# Patient Record
Sex: MALE | Race: WHITE | URBAN - METROPOLITAN AREA
[De-identification: names, ages, dates, MRNs, and addresses within clinical notes are randomized per-mention and may not be internally consistent; named-entity substitution may affect disease eponyms.]

---

## 2017-10-08 ENCOUNTER — INPATIENT (INPATIENT)
Facility: HOSPITAL | Age: 62
LOS: 2 days | Discharge: HOME | End: 2017-10-11
Attending: INTERNAL MEDICINE | Admitting: INTERNAL MEDICINE
Payer: MEDICARE

## 2017-10-08 VITALS — HEIGHT: 72 IN | WEIGHT: 289.91 LBS

## 2017-10-08 DIAGNOSIS — Z96.60 PRESENCE OF UNSPECIFIED ORTHOPEDIC JOINT IMPLANT: Chronic | ICD-10-CM

## 2017-10-08 DIAGNOSIS — E87.1 HYPO-OSMOLALITY AND HYPONATREMIA: ICD-10-CM

## 2017-10-08 DIAGNOSIS — Z98.89 OTHER SPECIFIED POSTPROCEDURAL STATES: Chronic | ICD-10-CM

## 2017-10-08 DIAGNOSIS — Z93.1 GASTROSTOMY STATUS: Chronic | ICD-10-CM

## 2017-10-08 DIAGNOSIS — A41.9 SEPSIS, UNSPECIFIED ORGANISM: ICD-10-CM

## 2017-10-08 DIAGNOSIS — N20.0 CALCULUS OF KIDNEY: ICD-10-CM

## 2017-10-08 DIAGNOSIS — Z90.49 ACQUIRED ABSENCE OF OTHER SPECIFIED PARTS OF DIGESTIVE TRACT: Chronic | ICD-10-CM

## 2017-10-08 DIAGNOSIS — R79.1 ABNORMAL COAGULATION PROFILE: ICD-10-CM

## 2017-10-08 DIAGNOSIS — E87.6 HYPOKALEMIA: ICD-10-CM

## 2017-10-08 DIAGNOSIS — Z98.84 BARIATRIC SURGERY STATUS: Chronic | ICD-10-CM

## 2017-10-08 DIAGNOSIS — Z93.0 TRACHEOSTOMY STATUS: Chronic | ICD-10-CM

## 2017-10-08 DIAGNOSIS — N17.9 ACUTE KIDNEY FAILURE, UNSPECIFIED: ICD-10-CM

## 2017-10-08 LAB
ALBUMIN SERPL ELPH-MCNC: 3.3 G/DL — SIGNIFICANT CHANGE UP (ref 3.3–5)
ALP SERPL-CCNC: 99 U/L — SIGNIFICANT CHANGE UP (ref 40–120)
ALT FLD-CCNC: 19 U/L — SIGNIFICANT CHANGE UP (ref 12–78)
ANION GAP SERPL CALC-SCNC: 12 MMOL/L — SIGNIFICANT CHANGE UP (ref 5–17)
APPEARANCE UR: (no result)
APTT BLD: 35.1 SEC — SIGNIFICANT CHANGE UP (ref 27.5–37.4)
AST SERPL-CCNC: 21 U/L — SIGNIFICANT CHANGE UP (ref 15–37)
BACTERIA # UR AUTO: (no result)
BASOPHILS # BLD AUTO: 0.1 K/UL — SIGNIFICANT CHANGE UP (ref 0–0.2)
BILIRUB SERPL-MCNC: 1.4 MG/DL — HIGH (ref 0.2–1.2)
BILIRUB UR-MCNC: NEGATIVE — SIGNIFICANT CHANGE UP
BUN SERPL-MCNC: 13 MG/DL — SIGNIFICANT CHANGE UP (ref 7–23)
CALCIUM SERPL-MCNC: 8.3 MG/DL — LOW (ref 8.5–10.1)
CHLORIDE SERPL-SCNC: 99 MMOL/L — SIGNIFICANT CHANGE UP (ref 96–108)
CO2 SERPL-SCNC: 20 MMOL/L — LOW (ref 22–31)
COLOR SPEC: YELLOW — SIGNIFICANT CHANGE UP
CREAT SERPL-MCNC: 1.38 MG/DL — HIGH (ref 0.5–1.3)
DIFF PNL FLD: (no result)
EOSINOPHIL # BLD AUTO: 0 K/UL — SIGNIFICANT CHANGE UP (ref 0–0.5)
EPI CELLS # UR: SIGNIFICANT CHANGE UP
GLUCOSE SERPL-MCNC: 153 MG/DL — HIGH (ref 70–99)
GLUCOSE UR QL: NEGATIVE MG/DL — SIGNIFICANT CHANGE UP
HCT VFR BLD CALC: 36.7 % — LOW (ref 39–50)
HGB BLD-MCNC: 13 G/DL — SIGNIFICANT CHANGE UP (ref 13–17)
INR BLD: 2.07 RATIO — HIGH (ref 0.88–1.16)
KETONES UR-MCNC: NEGATIVE — SIGNIFICANT CHANGE UP
LACTATE SERPL-SCNC: 1.1 MMOL/L — SIGNIFICANT CHANGE UP (ref 0.7–2)
LACTATE SERPL-SCNC: 2.3 MMOL/L — HIGH (ref 0.7–2)
LEUKOCYTE ESTERASE UR-ACNC: (no result)
LYMPHOCYTES # BLD AUTO: 0.5 K/UL — LOW (ref 1–3.3)
LYMPHOCYTES # BLD AUTO: 3 % — LOW (ref 13–44)
MANUAL DIF COMMENT BLD-IMP: SIGNIFICANT CHANGE UP
MCHC RBC-ENTMCNC: 30.6 PG — SIGNIFICANT CHANGE UP (ref 27–34)
MCHC RBC-ENTMCNC: 35.6 GM/DL — SIGNIFICANT CHANGE UP (ref 32–36)
MCV RBC AUTO: 86 FL — SIGNIFICANT CHANGE UP (ref 80–100)
MONOCYTES # BLD AUTO: 1.7 K/UL — HIGH (ref 0–0.9)
MONOCYTES NFR BLD AUTO: 14 % — SIGNIFICANT CHANGE UP (ref 2–14)
NEUTROPHILS # BLD AUTO: 14.6 K/UL — HIGH (ref 1.8–7.4)
NEUTROPHILS NFR BLD AUTO: 80 % — HIGH (ref 43–77)
NEUTS BAND # BLD: 3 % — SIGNIFICANT CHANGE UP (ref 0–8)
NITRITE UR-MCNC: NEGATIVE — SIGNIFICANT CHANGE UP
PH UR: 6 — SIGNIFICANT CHANGE UP (ref 5–8)
PLAT MORPH BLD: NORMAL — SIGNIFICANT CHANGE UP
PLATELET # BLD AUTO: 140 K/UL — LOW (ref 150–400)
POIKILOCYTOSIS BLD QL AUTO: SLIGHT — SIGNIFICANT CHANGE UP
POLYCHROMASIA BLD QL SMEAR: SLIGHT — SIGNIFICANT CHANGE UP
POTASSIUM SERPL-MCNC: 3.3 MMOL/L — LOW (ref 3.5–5.3)
POTASSIUM SERPL-SCNC: 3.3 MMOL/L — LOW (ref 3.5–5.3)
PROT SERPL-MCNC: 6.9 GM/DL — SIGNIFICANT CHANGE UP (ref 6–8.3)
PROT UR-MCNC: 15 MG/DL
PROTHROM AB SERPL-ACNC: 22.7 SEC — HIGH (ref 9.8–12.7)
RBC # BLD: 4.26 M/UL — SIGNIFICANT CHANGE UP (ref 4.2–5.8)
RBC # FLD: 12.7 % — SIGNIFICANT CHANGE UP (ref 10.3–14.5)
RBC BLD AUTO: SIGNIFICANT CHANGE UP
RBC CASTS # UR COMP ASSIST: (no result) /HPF (ref 0–4)
SODIUM SERPL-SCNC: 131 MMOL/L — LOW (ref 135–145)
SP GR SPEC: 1.01 — SIGNIFICANT CHANGE UP (ref 1.01–1.02)
UROBILINOGEN FLD QL: NEGATIVE MG/DL — SIGNIFICANT CHANGE UP
WBC # BLD: 16.8 K/UL — HIGH (ref 3.8–10.5)
WBC # FLD AUTO: 16.8 K/UL — HIGH (ref 3.8–10.5)
WBC UR QL: (no result)

## 2017-10-08 PROCEDURE — 71010: CPT | Mod: 26

## 2017-10-08 PROCEDURE — 93010 ELECTROCARDIOGRAM REPORT: CPT

## 2017-10-08 PROCEDURE — 99285 EMERGENCY DEPT VISIT HI MDM: CPT

## 2017-10-08 RX ORDER — SODIUM CHLORIDE 9 MG/ML
1000 INJECTION INTRAMUSCULAR; INTRAVENOUS; SUBCUTANEOUS ONCE
Qty: 0 | Refills: 0 | Status: COMPLETED | OUTPATIENT
Start: 2017-10-08 | End: 2017-10-08

## 2017-10-08 RX ORDER — SODIUM CHLORIDE 9 MG/ML
2000 INJECTION INTRAMUSCULAR; INTRAVENOUS; SUBCUTANEOUS ONCE
Qty: 0 | Refills: 0 | Status: COMPLETED | OUTPATIENT
Start: 2017-10-08 | End: 2017-10-09

## 2017-10-08 RX ORDER — ACETAMINOPHEN 500 MG
1000 TABLET ORAL ONCE
Qty: 0 | Refills: 0 | Status: COMPLETED | OUTPATIENT
Start: 2017-10-08 | End: 2017-10-08

## 2017-10-08 RX ORDER — PANTOPRAZOLE SODIUM 20 MG/1
40 TABLET, DELAYED RELEASE ORAL
Qty: 0 | Refills: 0 | Status: DISCONTINUED | OUTPATIENT
Start: 2017-10-08 | End: 2017-10-11

## 2017-10-08 RX ORDER — VANCOMYCIN HCL 1 G
1000 VIAL (EA) INTRAVENOUS ONCE
Qty: 0 | Refills: 0 | Status: COMPLETED | OUTPATIENT
Start: 2017-10-08 | End: 2017-10-08

## 2017-10-08 RX ORDER — LEVOTHYROXINE SODIUM 125 MCG
75 TABLET ORAL DAILY
Qty: 0 | Refills: 0 | Status: DISCONTINUED | OUTPATIENT
Start: 2017-10-08 | End: 2017-10-11

## 2017-10-08 RX ORDER — CEFEPIME 1 G/1
2000 INJECTION, POWDER, FOR SOLUTION INTRAMUSCULAR; INTRAVENOUS ONCE
Qty: 0 | Refills: 0 | Status: COMPLETED | OUTPATIENT
Start: 2017-10-08 | End: 2017-10-08

## 2017-10-08 RX ORDER — RIVAROXABAN 15 MG-20MG
20 KIT ORAL EVERY 24 HOURS
Qty: 0 | Refills: 0 | Status: DISCONTINUED | OUTPATIENT
Start: 2017-10-08 | End: 2017-10-09

## 2017-10-08 RX ORDER — AMLODIPINE BESYLATE 2.5 MG/1
10 TABLET ORAL DAILY
Qty: 0 | Refills: 0 | Status: DISCONTINUED | OUTPATIENT
Start: 2017-10-08 | End: 2017-10-10

## 2017-10-08 RX ORDER — ONDANSETRON 8 MG/1
4 TABLET, FILM COATED ORAL ONCE
Qty: 0 | Refills: 0 | Status: COMPLETED | OUTPATIENT
Start: 2017-10-08 | End: 2017-10-08

## 2017-10-08 RX ORDER — MORPHINE SULFATE 50 MG/1
4 CAPSULE, EXTENDED RELEASE ORAL ONCE
Qty: 0 | Refills: 0 | Status: DISCONTINUED | OUTPATIENT
Start: 2017-10-08 | End: 2017-10-08

## 2017-10-08 RX ADMIN — ONDANSETRON 4 MILLIGRAM(S): 8 TABLET, FILM COATED ORAL at 20:40

## 2017-10-08 RX ADMIN — SODIUM CHLORIDE 1000 MILLILITER(S): 9 INJECTION INTRAMUSCULAR; INTRAVENOUS; SUBCUTANEOUS at 19:45

## 2017-10-08 RX ADMIN — Medication 1000 MILLIGRAM(S): at 19:40

## 2017-10-08 RX ADMIN — MORPHINE SULFATE 4 MILLIGRAM(S): 50 CAPSULE, EXTENDED RELEASE ORAL at 20:40

## 2017-10-08 RX ADMIN — CEFEPIME 100 MILLIGRAM(S): 1 INJECTION, POWDER, FOR SOLUTION INTRAMUSCULAR; INTRAVENOUS at 20:01

## 2017-10-08 RX ADMIN — Medication 250 MILLIGRAM(S): at 20:26

## 2017-10-08 NOTE — H&P ADULT - NSHPSOCIALHISTORY_GEN_ALL_CORE
History of ETOH with withdraw seizure 2015, quit drinking 2015  Never smoker  Medical Marijuana for neurology pain  Live with son     Full Code

## 2017-10-08 NOTE — H&P ADULT - NSHPPHYSICALEXAM_GEN_ALL_CORE
PHYSICAL EXAM:    GENERAL: NAD, well-groomed, well-developed  HEAD:  NC/AT  EYES: EOMI, PERRLA, no scleral icterus  HEENT: Moist mucous membranes  NECK: Supple, No JVD  CNS:  Alert & Oriented X3  LUNG: Clear to auscultation bilaterally; No rales, rhonchi, wheezing, or rubs  HEART: RRR; No murmurs, rubs, or gallops  ABDOMEN: +BS, multiple surgical scars, +CVA tenderness left Vital Signs Last 24 Hrs  T(C): 37 (09 Oct 2017 01:30), Max: 40.4 (08 Oct 2017 19:29)  T(F): 98.6 (09 Oct 2017 01:30), Max: 104.7 (08 Oct 2017 19:29)  HR: 84 (09 Oct 2017 01:30) (84 - 120)  BP: 115/56 (09 Oct 2017 01:30) (108/52 - 135/63)  BP(mean): --  RR: 18 (09 Oct 2017 01:30) (18 - 22)  SpO2: 98% (09 Oct 2017 01:30) (98% - 99%)    PHYSICAL EXAM:    GENERAL: NAD, well-groomed, well-developed  HEAD:  NC/AT  EYES: EOMI, PERRLA, no scleral icterus  HEENT: Moist mucous membranes  NECK: Supple, No JVD  CNS:  Alert & Oriented X3  LUNG: Clear to auscultation bilaterally; No rales, rhonchi, wheezing, or rubs  HEART: RRR; No murmurs, rubs, or gallops  ABDOMEN: +BS, multiple surgical scars, +CVA tenderness left

## 2017-10-08 NOTE — H&P ADULT - PSH
Feeding by G-tube  placed and reversed summer 2015  H/O gastric bypass  1990s and 2011  History of gastric surgery  gastric sleeve 1990s  History of tracheostomy  placed and reversed summer 2015  S/P cholecystectomy    S/P hip replacement  b/l

## 2017-10-08 NOTE — H&P ADULT - PMH
Acquired hypothyroidism    Alcohol abuse  sober since 4/13/2015  DVT (deep venous thrombosis), right  ankle  Hypertension    Other hyperlipidemia    Pulmonary emboli

## 2017-10-08 NOTE — H&P ADULT - PROBLEM SELECTOR PLAN 1
Likely secondary to urosepsis  Admit to med-surg   F/u UA, blood culture  Lactate 2.6-->1   s/p 1L IVF at ED  Cont IVF at 150cc/hr  Cont Vanco and cefepime   Urology consult   ID consult Likely secondary to urosepsis  Admit to med-surg   F/u UA, blood culture  Lactate 2.6-->1   s/p 1L IVF at ED  2L Stat NS  Cont IVF at 150cc/hr  Cont Vanco and cefepime   Urology consult   ID consult Likely secondary to urosepsis  Admit to med-surg   F/u UA, blood culture  Lactate 2.6-->1   s/p 1L IVF at ED  2L Stat NS  Cont IVF at 150cc/hr  Cont cefepime   Urology consult   ID consult for continue of vanco

## 2017-10-08 NOTE — ED PROVIDER NOTE - OBJECTIVE STATEMENT
61 y/o M with Hx of HTN, HLD, newly diagnosed kidney stone presents to ED for evaluation of worsening left flank pain, fever, and n/v. Pt states he [facility]. The stone size was 1.2cm and he was encouraged to f/u with urology this week. Pt today developed worsening n/v with a fever (104.7F). Pt denies HA, SOB, CP.

## 2017-10-08 NOTE — H&P ADULT - NSHPLABSRESULTS_GEN_ALL_CORE
Lab Results:                                            13.0                  Neurophils% (auto):   80.0   (10-08 @ 20:00):    16.8 )-----------(140          Lymphocytes% (auto):  3.0                                           36.7                   Eosinphils% (auto):   x        Manual%: Neutrophils x    ; Lymphocytes x    ; Eosinophils x    ; Bands%: 3.0  ; Blasts x         Differential:	[] Automated		[] Manual    10-08    131<L>  |  99  |  13  ----------------------------<  153<H>  3.3<L>   |  20<L>  |  1.38<H>    Ca    8.3<L>      08 Oct 2017 20:00    TPro  6.9  /  Alb  3.3  /  TBili  1.4<H>  /  DBili  x   /  AST  21  /  ALT  19  /  AlkPhos  99  10-08    PT/INR - ( 08 Oct 2017 20:00 )   PT: 22.7 sec;   INR: 2.07 ratio         PTT - ( 08 Oct 2017 20:00 )  PTT:35.1 sec  Urinalysis Basic - ( 08 Oct 2017 21:38 )    Color: Yellow / Appearance: Slightly Turbid / S.015 / pH: x  Gluc: x / Ketone: Negative  / Bili: Negative / Urobili: Negative mg/dL   Blood: x / Protein: 15 mg/dL / Nitrite: Negative   Leuk Esterase: Moderate / RBC: 6-10 /HPF / WBC 26-50   Sq Epi: x / Non Sq Epi: Occasional / Bacteria: Few

## 2017-10-08 NOTE — ED ADULT TRIAGE NOTE - CHIEF COMPLAINT QUOTE
kidney stone with fever at home. Pt was dx with kidney stone on the 7th/ Pt was too weak to get out of bed today.

## 2017-10-08 NOTE — ED STATDOCS - PSH
Feeding by G-tube  placed and reversed summer 2015  H/O gastric bypass  1990s and 2011  History of gastric surgery  gastric sleeve 1990s  History of tracheostomy  placed and reversed summer 2015  S/P hip replacement  b/l

## 2017-10-08 NOTE — H&P ADULT - HISTORY OF PRESENT ILLNESS
Pt is a 61yo M w PMH of HTN, HLD, hypothyrodism, multiple DVT, PE, gastric sleeve surgery present to ER with c/o fever, chills and left flank pain. Pt states he started developed sharp left flank pain 3 days ago and [facility]. Pt was diagnosed with multiple kidney stones with largest 1.2cm, pt was given Cipro and naprosyn and was DCed home with follow up appointment with urology on Tue. Pt was instructed to go to ER if developed fever. Pt states today pt have fever and chills. Tmax was 104F. Pt has 8/10 sharp non radiating left flank pain, better with morphine. It is associated with nausea and dry heaving. Pt has small amount of urination today, no blood. Pt states he also has questionable enlarged prostate. Pt denies of any CP, SOB, swelling of legs, problem with bowel.     At ER. WBC Pt is a 61yo M w PMH of HTN, HLD, hypothyrodism, multiple DVT, PE, gastric sleeve surgery present to ER with c/o fever, chills and left flank pain. Pt states he started developed sharp left flank pain 3 days ago and [facility]. Pt was diagnosed with multiple kidney stones with largest 1.2cm, pt was given Cipro and naprosyn and was DCed home with follow up appointment with urology on Tue. Pt was instructed to go to ER if developed fever. Pt states today pt have fever and chills. Tmax was 104F. Pt has 8/10 sharp non radiating left flank pain, better with morphine. It is associated with nausea and dry heaving. Pt has small amount of urination today, no blood. Pt states he also has questionable enlarged prostate. Pt denies of any CP, SOB, swelling of legs, problem with bowel.     At ER. WBC 16.8, Lactate 2.3, INR 2.07, PT 22.7. Pt was given 1 L NS bolus, Vanco and Cefipime. Pending blood and urine culture. Pt is a 61yo M w PMH of HTN, HLD, hypothyrodism, multiple DVT, PE, gastric sleeve surgery present to ER with c/o fever, chills and left flank pain. Pt states he started developed sharp left flank pain 3 days ago and [facility]. Pt was diagnosed with multiple kidney stones with largest 1.2cm, pt was given Cipro and naprosyn and was DCed home with follow up appointment with urology on Tue. Pt was instructed to go to ER if developed fever. Pt states today pt have fever and chills. Tmax was 104F. Pt has 8/10 sharp non radiating left flank pain, better with morphine. It is associated with nausea and dry heaving. Pt has small amount of urination today, no blood. Pt states he also has questionable enlarged prostate. Pt denies of any CP, SOB, swelling of legs, problem with bowel.     At ER. WBC 16.8, Lactate 2.3, +UA, INR 2.07, PT 22.7. Pt was given 1 L NS bolus, Vanco and Cefipime. Pending blood and urine culture.

## 2017-10-08 NOTE — H&P ADULT - ATTENDING COMMENTS
Pt was seen and examined after initial eval by resident physician Kishore Guillory. Case discussed in detail. Agree with assessment and plan. This is a 62 y.o. male with PMH HTN, HLD, hypothyroidism, multiple DVT, PE, gastric sleeve surgery presents with fever, chills and left flank pain.    #severe sepsis (Tm 104.7, leukocytosis 16.8, tachycardia 120, lactate 2.3->1.1) due to UTI, possible infected kidney stone  -resolving  -admit to med surg  -f/u cultures  -CT abd w/o contrast  -iv hydration  -cont cefepime (Allergy to PCN, but pt tolerated cefepime in ED)  -pain control, antipyretics  -urology consult  -ID consult    #hypokalemia, hyponatremia  -replete and monitor  -iv fluids    #MESHA likely from kidney stones  -monitor renal function, iv fluids    #hx DVT/PE  #DVT ppx  -on xarelto    #will need med rec in AM  -pt goes to [facility]. States sees Dr Flores. [facility].

## 2017-10-09 LAB
ANION GAP SERPL CALC-SCNC: 10 MMOL/L — SIGNIFICANT CHANGE UP (ref 5–17)
ANION GAP SERPL CALC-SCNC: 9 MMOL/L — SIGNIFICANT CHANGE UP (ref 5–17)
APTT BLD: 32.1 SEC — SIGNIFICANT CHANGE UP (ref 27.5–37.4)
BASOPHILS # BLD AUTO: 0 K/UL — SIGNIFICANT CHANGE UP (ref 0–0.2)
BASOPHILS NFR BLD AUTO: 0.2 % — SIGNIFICANT CHANGE UP (ref 0–2)
BUN SERPL-MCNC: 12 MG/DL — SIGNIFICANT CHANGE UP (ref 7–23)
BUN SERPL-MCNC: 14 MG/DL — SIGNIFICANT CHANGE UP (ref 7–23)
CALCIUM SERPL-MCNC: 7.6 MG/DL — LOW (ref 8.5–10.1)
CALCIUM SERPL-MCNC: 7.7 MG/DL — LOW (ref 8.5–10.1)
CHLORIDE SERPL-SCNC: 104 MMOL/L — SIGNIFICANT CHANGE UP (ref 96–108)
CHLORIDE SERPL-SCNC: 105 MMOL/L — SIGNIFICANT CHANGE UP (ref 96–108)
CO2 SERPL-SCNC: 21 MMOL/L — LOW (ref 22–31)
CO2 SERPL-SCNC: 21 MMOL/L — LOW (ref 22–31)
CREAT SERPL-MCNC: 1.04 MG/DL — SIGNIFICANT CHANGE UP (ref 0.5–1.3)
CREAT SERPL-MCNC: 1.16 MG/DL — SIGNIFICANT CHANGE UP (ref 0.5–1.3)
CULTURE RESULTS: SIGNIFICANT CHANGE UP
EOSINOPHIL # BLD AUTO: 0 K/UL — SIGNIFICANT CHANGE UP (ref 0–0.5)
EOSINOPHIL NFR BLD AUTO: 0 % — SIGNIFICANT CHANGE UP (ref 0–6)
GLUCOSE SERPL-MCNC: 104 MG/DL — HIGH (ref 70–99)
GLUCOSE SERPL-MCNC: 92 MG/DL — SIGNIFICANT CHANGE UP (ref 70–99)
HCT VFR BLD CALC: 31.4 % — LOW (ref 39–50)
HCT VFR BLD CALC: 33.2 % — LOW (ref 39–50)
HGB BLD-MCNC: 11 G/DL — LOW (ref 13–17)
HGB BLD-MCNC: 11.6 G/DL — LOW (ref 13–17)
INR BLD: 1.77 RATIO — HIGH (ref 0.88–1.16)
LACTATE SERPL-SCNC: 1.3 MMOL/L — SIGNIFICANT CHANGE UP (ref 0.7–2)
LACTATE SERPL-SCNC: 1.7 MMOL/L — SIGNIFICANT CHANGE UP (ref 0.7–2)
LACTATE SERPL-SCNC: 2.7 MMOL/L — HIGH (ref 0.7–2)
LDH SERPL L TO P-CCNC: 202 U/L — SIGNIFICANT CHANGE UP (ref 50–242)
LYMPHOCYTES # BLD AUTO: 0.7 K/UL — LOW (ref 1–3.3)
LYMPHOCYTES # BLD AUTO: 7 % — LOW (ref 13–44)
MCHC RBC-ENTMCNC: 30.1 PG — SIGNIFICANT CHANGE UP (ref 27–34)
MCHC RBC-ENTMCNC: 30.4 PG — SIGNIFICANT CHANGE UP (ref 27–34)
MCHC RBC-ENTMCNC: 34.8 GM/DL — SIGNIFICANT CHANGE UP (ref 32–36)
MCHC RBC-ENTMCNC: 35.1 GM/DL — SIGNIFICANT CHANGE UP (ref 32–36)
MCV RBC AUTO: 86.4 FL — SIGNIFICANT CHANGE UP (ref 80–100)
MCV RBC AUTO: 86.5 FL — SIGNIFICANT CHANGE UP (ref 80–100)
MONOCYTES # BLD AUTO: 0.9 K/UL — SIGNIFICANT CHANGE UP (ref 0–0.9)
MONOCYTES NFR BLD AUTO: 9 % — SIGNIFICANT CHANGE UP (ref 2–14)
NEUTROPHILS # BLD AUTO: 8.1 K/UL — HIGH (ref 1.8–7.4)
NEUTROPHILS NFR BLD AUTO: 83.8 % — HIGH (ref 43–77)
PLATELET # BLD AUTO: 104 K/UL — LOW (ref 150–400)
PLATELET # BLD AUTO: 106 K/UL — LOW (ref 150–400)
POTASSIUM SERPL-MCNC: 3.6 MMOL/L — SIGNIFICANT CHANGE UP (ref 3.5–5.3)
POTASSIUM SERPL-MCNC: 3.7 MMOL/L — SIGNIFICANT CHANGE UP (ref 3.5–5.3)
POTASSIUM SERPL-SCNC: 3.6 MMOL/L — SIGNIFICANT CHANGE UP (ref 3.5–5.3)
POTASSIUM SERPL-SCNC: 3.7 MMOL/L — SIGNIFICANT CHANGE UP (ref 3.5–5.3)
PROTHROM AB SERPL-ACNC: 19.3 SEC — HIGH (ref 9.8–12.7)
RBC # BLD: 3.63 M/UL — LOW (ref 4.2–5.8)
RBC # BLD: 3.84 M/UL — LOW (ref 4.2–5.8)
RBC # FLD: 12.9 % — SIGNIFICANT CHANGE UP (ref 10.3–14.5)
RBC # FLD: 13 % — SIGNIFICANT CHANGE UP (ref 10.3–14.5)
SODIUM SERPL-SCNC: 135 MMOL/L — SIGNIFICANT CHANGE UP (ref 135–145)
SODIUM SERPL-SCNC: 135 MMOL/L — SIGNIFICANT CHANGE UP (ref 135–145)
SPECIMEN SOURCE: SIGNIFICANT CHANGE UP
TSH SERPL-MCNC: 0.45 UIU/ML — SIGNIFICANT CHANGE UP (ref 0.36–3.74)
WBC # BLD: 8 K/UL — SIGNIFICANT CHANGE UP (ref 3.8–10.5)
WBC # BLD: 9.6 K/UL — SIGNIFICANT CHANGE UP (ref 3.8–10.5)
WBC # FLD AUTO: 8 K/UL — SIGNIFICANT CHANGE UP (ref 3.8–10.5)
WBC # FLD AUTO: 9.6 K/UL — SIGNIFICANT CHANGE UP (ref 3.8–10.5)

## 2017-10-09 PROCEDURE — 71010: CPT | Mod: 26

## 2017-10-09 PROCEDURE — 74176 CT ABD & PELVIS W/O CONTRAST: CPT | Mod: 26

## 2017-10-09 RX ORDER — ACETAMINOPHEN 500 MG
650 TABLET ORAL EVERY 6 HOURS
Qty: 0 | Refills: 0 | Status: DISCONTINUED | OUTPATIENT
Start: 2017-10-09 | End: 2017-10-11

## 2017-10-09 RX ORDER — SODIUM CHLORIDE 9 MG/ML
1000 INJECTION INTRAMUSCULAR; INTRAVENOUS; SUBCUTANEOUS
Qty: 0 | Refills: 0 | Status: DISCONTINUED | OUTPATIENT
Start: 2017-10-09 | End: 2017-10-11

## 2017-10-09 RX ORDER — HEPARIN SODIUM 5000 [USP'U]/ML
INJECTION INTRAVENOUS; SUBCUTANEOUS
Qty: 25000 | Refills: 0 | Status: DISCONTINUED | OUTPATIENT
Start: 2017-10-09 | End: 2017-10-09

## 2017-10-09 RX ORDER — SODIUM CHLORIDE 9 MG/ML
1000 INJECTION INTRAMUSCULAR; INTRAVENOUS; SUBCUTANEOUS
Qty: 0 | Refills: 0 | Status: DISCONTINUED | OUTPATIENT
Start: 2017-10-09 | End: 2017-10-09

## 2017-10-09 RX ORDER — ACETAMINOPHEN 500 MG
650 TABLET ORAL ONCE
Qty: 0 | Refills: 0 | Status: COMPLETED | OUTPATIENT
Start: 2017-10-09 | End: 2017-10-09

## 2017-10-09 RX ORDER — MORPHINE SULFATE 50 MG/1
2 CAPSULE, EXTENDED RELEASE ORAL EVERY 6 HOURS
Qty: 0 | Refills: 0 | Status: COMPLETED | OUTPATIENT
Start: 2017-10-09 | End: 2017-10-10

## 2017-10-09 RX ORDER — CEFTRIAXONE 500 MG/1
1 INJECTION, POWDER, FOR SOLUTION INTRAMUSCULAR; INTRAVENOUS EVERY 24 HOURS
Qty: 0 | Refills: 0 | Status: DISCONTINUED | OUTPATIENT
Start: 2017-10-09 | End: 2017-10-09

## 2017-10-09 RX ORDER — POTASSIUM CHLORIDE 20 MEQ
20 PACKET (EA) ORAL ONCE
Qty: 0 | Refills: 0 | Status: COMPLETED | OUTPATIENT
Start: 2017-10-09 | End: 2017-10-09

## 2017-10-09 RX ORDER — ONDANSETRON 8 MG/1
4 TABLET, FILM COATED ORAL EVERY 6 HOURS
Qty: 0 | Refills: 0 | Status: DISCONTINUED | OUTPATIENT
Start: 2017-10-09 | End: 2017-10-11

## 2017-10-09 RX ORDER — CEFEPIME 1 G/1
2000 INJECTION, POWDER, FOR SOLUTION INTRAMUSCULAR; INTRAVENOUS EVERY 12 HOURS
Qty: 0 | Refills: 0 | Status: DISCONTINUED | OUTPATIENT
Start: 2017-10-09 | End: 2017-10-09

## 2017-10-09 RX ORDER — CEFTRIAXONE 500 MG/1
1 INJECTION, POWDER, FOR SOLUTION INTRAMUSCULAR; INTRAVENOUS EVERY 24 HOURS
Qty: 0 | Refills: 0 | Status: DISCONTINUED | OUTPATIENT
Start: 2017-10-09 | End: 2017-10-11

## 2017-10-09 RX ORDER — HEPARIN SODIUM 5000 [USP'U]/ML
10000 INJECTION INTRAVENOUS; SUBCUTANEOUS EVERY 6 HOURS
Qty: 0 | Refills: 0 | Status: DISCONTINUED | OUTPATIENT
Start: 2017-10-10 | End: 2017-10-11

## 2017-10-09 RX ORDER — HEPARIN SODIUM 5000 [USP'U]/ML
10000 INJECTION INTRAVENOUS; SUBCUTANEOUS ONCE
Qty: 0 | Refills: 0 | Status: DISCONTINUED | OUTPATIENT
Start: 2017-10-09 | End: 2017-10-09

## 2017-10-09 RX ORDER — HEPARIN SODIUM 5000 [USP'U]/ML
10000 INJECTION INTRAVENOUS; SUBCUTANEOUS EVERY 6 HOURS
Qty: 0 | Refills: 0 | Status: DISCONTINUED | OUTPATIENT
Start: 2017-10-09 | End: 2017-10-09

## 2017-10-09 RX ORDER — INFLUENZA VIRUS VACCINE 15; 15; 15; 15 UG/.5ML; UG/.5ML; UG/.5ML; UG/.5ML
0.5 SUSPENSION INTRAMUSCULAR ONCE
Qty: 0 | Refills: 0 | Status: COMPLETED | OUTPATIENT
Start: 2017-10-09 | End: 2017-10-10

## 2017-10-09 RX ORDER — HEPARIN SODIUM 5000 [USP'U]/ML
5000 INJECTION INTRAVENOUS; SUBCUTANEOUS EVERY 6 HOURS
Qty: 0 | Refills: 0 | Status: DISCONTINUED | OUTPATIENT
Start: 2017-10-09 | End: 2017-10-09

## 2017-10-09 RX ORDER — HEPARIN SODIUM 5000 [USP'U]/ML
5000 INJECTION INTRAVENOUS; SUBCUTANEOUS EVERY 6 HOURS
Qty: 0 | Refills: 0 | Status: DISCONTINUED | OUTPATIENT
Start: 2017-10-10 | End: 2017-10-11

## 2017-10-09 RX ORDER — IBUPROFEN 200 MG
400 TABLET ORAL ONCE
Qty: 0 | Refills: 0 | Status: COMPLETED | OUTPATIENT
Start: 2017-10-09 | End: 2017-10-09

## 2017-10-09 RX ORDER — HEPARIN SODIUM 5000 [USP'U]/ML
INJECTION INTRAVENOUS; SUBCUTANEOUS
Qty: 25000 | Refills: 0 | Status: DISCONTINUED | OUTPATIENT
Start: 2017-10-10 | End: 2017-10-11

## 2017-10-09 RX ADMIN — Medication 650 MILLIGRAM(S): at 21:32

## 2017-10-09 RX ADMIN — SODIUM CHLORIDE 150 MILLILITER(S): 9 INJECTION INTRAMUSCULAR; INTRAVENOUS; SUBCUTANEOUS at 11:15

## 2017-10-09 RX ADMIN — MORPHINE SULFATE 2 MILLIGRAM(S): 50 CAPSULE, EXTENDED RELEASE ORAL at 22:28

## 2017-10-09 RX ADMIN — AMLODIPINE BESYLATE 10 MILLIGRAM(S): 2.5 TABLET ORAL at 06:26

## 2017-10-09 RX ADMIN — RIVAROXABAN 20 MILLIGRAM(S): KIT at 08:37

## 2017-10-09 RX ADMIN — SODIUM CHLORIDE 1000 MILLILITER(S): 9 INJECTION INTRAMUSCULAR; INTRAVENOUS; SUBCUTANEOUS at 00:30

## 2017-10-09 RX ADMIN — CEFTRIAXONE 100 GRAM(S): 500 INJECTION, POWDER, FOR SOLUTION INTRAMUSCULAR; INTRAVENOUS at 18:34

## 2017-10-09 RX ADMIN — Medication 20 MILLIEQUIVALENT(S): at 01:59

## 2017-10-09 RX ADMIN — Medication 75 MICROGRAM(S): at 06:25

## 2017-10-09 RX ADMIN — Medication 650 MILLIGRAM(S): at 06:26

## 2017-10-09 RX ADMIN — CEFEPIME 100 MILLIGRAM(S): 1 INJECTION, POWDER, FOR SOLUTION INTRAMUSCULAR; INTRAVENOUS at 06:25

## 2017-10-09 RX ADMIN — SODIUM CHLORIDE 100 MILLILITER(S): 9 INJECTION INTRAMUSCULAR; INTRAVENOUS; SUBCUTANEOUS at 18:35

## 2017-10-09 RX ADMIN — Medication 1000 MILLIGRAM(S): at 01:37

## 2017-10-09 RX ADMIN — PANTOPRAZOLE SODIUM 40 MILLIGRAM(S): 20 TABLET, DELAYED RELEASE ORAL at 06:25

## 2017-10-09 RX ADMIN — MORPHINE SULFATE 4 MILLIGRAM(S): 50 CAPSULE, EXTENDED RELEASE ORAL at 01:37

## 2017-10-09 RX ADMIN — SODIUM CHLORIDE 150 MILLILITER(S): 9 INJECTION INTRAMUSCULAR; INTRAVENOUS; SUBCUTANEOUS at 03:56

## 2017-10-09 NOTE — PROGRESS NOTE ADULT - SUBJECTIVE AND OBJECTIVE BOX
Pt was seen and examined at bedside because RN called to eval. for Temp: Pt was seen and examined at bedside because RN called to shannan. for Temp:  102.8 F. Pt is resting in bed. Pt reported that he is having flank pain since admission but did not receive pain meds this evening sopain is getting worse now requesting pain meds IV Morphine. Also c/o nausea. Denies gross hematuria, diarrhea or chills. Pt is admitted for sepsis 2/2 to Nephrolithiasis. Pt is on IV Ceftriaxone and IVF gentle hydration. ON exam: left flank tenderness +, no rebound tenderness. Stat CBC, BMP and lactate ordered.     Vitals: 102.8 F 126/54 mm hg, 107, 18,  100 % at RA    A/P:  # Nephrolithiasis  - Pt is on 5E  - Stat lactate: 1.3  - Leucocytosis improved as compare to admission  - VSS except Temp of 102.8 F  - c/w IV Abx  - IVF NS hydration  - Less likely septic now, as lactate is wnl, leucocytosis improved, No hypotension, no tachycardia  - Blood culture - no growth to date   - Pt is going for possible left percutaneous nephrostomy  - Tylenol prn/Cold compress  - Pain meds prn    Hospitalist to f/u in AM    Plan d/w Pt and Night RN

## 2017-10-09 NOTE — PROGRESS NOTE ADULT - PROBLEM SELECTOR PLAN 1
Pt with urinary infection, probably chronic and relates to possible left early staghorn stone as well as bph with incomplete emptying of nladder. Pt currently resting comfortably in chair. A repeat stat lactate level is pending. The xeralto is stopped and pt is on iv heparin. Possible left percutaneous nephrostomy.

## 2017-10-09 NOTE — PROGRESS NOTE ADULT - SUBJECTIVE AND OBJECTIVE BOX
Pt is a 63yo M w PMH of HTN, HLD, hypothyrodism, multiple DVT, PE, gastric sleeve surgery present to ER with c/o fever, chills and left flank pain. Pt states he started developed sharp left flank pain 3 days ago and [facility]. Pt was diagnosed with multiple kidney stones with largest 1.2cm, pt was given Cipro and naprosyn and was DCed home with follow up appointment with urology on Tue. Pt was instructed to go to ER if developed fever. Pt states today pt have fever and chills. Tmax was 104F. Pt has 8/10 sharp non radiating left flank pain, better with morphine. It is associated with nausea and dry heaving. Adm for furthe management    10/9: feels better, has an appetite this am; not in pain    Vital Signs Last 24 Hrs  T(C): 38.1 (09 Oct 2017 08:54), Max: 40.4 (08 Oct 2017 19:29)  T(F): 100.6 (09 Oct 2017 08:54), Max: 104.7 (08 Oct 2017 19:29)  HR: 84 (09 Oct 2017 06:50) (78 - 120)  BP: 139/50 (09 Oct 2017 06:50) (108/52 - 139/50)  BP(mean): --  RR: 18 (09 Oct 2017 06:50) (18 - 22)  SpO2: 98% (09 Oct 2017 06:50) (98% - 100%)    · CONSTITUTIONAL: Well appearing, well nourished, awake, alert, oriented to person, place, time/situation and in no apparent distress.  · ENMT: Airway patent, Nasal mucosa clear. Mouth with normal mucosa. Throat has no vesicles, no oropharyngeal exudates and uvula is midline.  · EYES: Clear bilaterally, pupils equal, round and reactive to light.  · CARDIAC: Normal rate, regular rhythm.  Heart sounds S1, S2.  No murmurs, rubs or gallops.  · RESPIRATORY: Breath sounds clear and equal bilaterally.  · GASTROINTESTINAL: Abdomen soft, non-tender, no guarding.  · MUSCULOSKELETAL: Spine appears normal, range of motion is not limited, no muscle or joint tenderness                            11.6   9.6   )-----------( 106      ( 09 Oct 2017 07:13 )             33.2   10-09    135  |  104  |  12  ----------------------------<  104<H>  3.6   |  21<L>  |  1.16    Ca    7.7<L>      09 Oct 2017 07:13    TPro  6.9  /  Alb  3.3  /  TBili  1.4<H>  /  DBili  x   /  AST  21  /  ALT  19  /  AlkPhos  99  10-08    lactate 2.7      A/P:    Sepsis  Nephrolithiasis  h/o VTE    Plan:  - ceftriaxone  - repeat lactate c/w ivf  - IR eval requested per Dr Rodrigues re: PCN  - h/o VTE: dc DOAC for now and use UFH drip for poss procedure    case d/w ; message left for IR Pt is a 61yo M w PMH of HTN, HLD, hypothyrodism, multiple DVT, PE, gastric sleeve surgery present to ER with c/o fever, chills and left flank pain. Pt states he started developed sharp left flank pain 3 days ago and [facility]. Pt was diagnosed with multiple kidney stones with largest 1.2cm, pt was given Cipro and naprosyn and was DCed home with follow up appointment with urology on Tue. Pt was instructed to go to ER if developed fever. Pt states today pt have fever and chills. Tmax was 104F. Pt has 8/10 sharp non radiating left flank pain, better with morphine. It is associated with nausea and dry heaving. Adm for furthe management    10/9: feels better, has an appetite this am; not in pain    Vital Signs Last 24 Hrs  T(C): 38.1 (09 Oct 2017 08:54), Max: 40.4 (08 Oct 2017 19:29)  T(F): 100.6 (09 Oct 2017 08:54), Max: 104.7 (08 Oct 2017 19:29)  HR: 84 (09 Oct 2017 06:50) (78 - 120)  BP: 139/50 (09 Oct 2017 06:50) (108/52 - 139/50)  BP(mean): --  RR: 18 (09 Oct 2017 06:50) (18 - 22)  SpO2: 98% (09 Oct 2017 06:50) (98% - 100%)    · CONSTITUTIONAL: Well appearing, well nourished, awake, alert, oriented to person, place, time/situation and in no apparent distress.  · ENMT: Airway patent, Nasal mucosa clear. Mouth with normal mucosa. Throat has no vesicles, no oropharyngeal exudates and uvula is midline.  · EYES: Clear bilaterally, pupils equal, round and reactive to light.  · CARDIAC: Normal rate, regular rhythm.  Heart sounds S1, S2.  No murmurs, rubs or gallops.  · RESPIRATORY: Breath sounds clear and equal bilaterally.  · GASTROINTESTINAL: Abdomen soft, non-tender, no guarding.  · MUSCULOSKELETAL: Spine appears normal, range of motion is not limited, no muscle or joint tenderness                            11.6   9.6   )-----------( 106      ( 09 Oct 2017 07:13 )             33.2   10-09    135  |  104  |  12  ----------------------------<  104<H>  3.6   |  21<L>  |  1.16    Ca    7.7<L>      09 Oct 2017 07:13    TPro  6.9  /  Alb  3.3  /  TBili  1.4<H>  /  DBili  x   /  AST  21  /  ALT  19  /  AlkPhos  99  10-08    lactate 2.7      A/P:    Sepsis  Nephrolithiasis  h/o VTE  Incidentaloma: splenomegaly    Plan:  - ceftriaxone  - repeat lactate c/w ivf  - IR eval requested per Dr Rodrigues re: PCN  - h/o VTE: dc DOAC for now and use UFH drip for poss procedure  - outpt f/up splenomegaly    case d/w ; message left for IR

## 2017-10-09 NOTE — PROGRESS NOTE ADULT - SUBJECTIVE AND OBJECTIVE BOX
CHIEF COMPLAINT: uti, renal stone    HISTORY OF PRESENT ILLNESS: Pt previously treated at [facility] by urologists on staff. His issues have included recurrent urinary infections, prolonged intermittent hyde for reported retention and bladder infections. He has known about renal stones when previously treated with cholecystectomy. I reviewed CT with radiologist. There appears to be an early 1.0 cm partial left staghorn stone. Pt takes xeralto.    PAST MEDICAL & SURGICAL HISTORY:  Other hyperlipidemia  Pulmonary emboli  Acquired hypothyroidism  Hypertension  Alcohol abuse: sober since 2015  DVT (deep venous thrombosis), right: ankle  S/P cholecystectomy  History of tracheostomy: placed and reversed summer 2015  Feeding by G-tube: placed and reversed summer 2015  S/P hip replacement: b/l  History of gastric surgery: gastric sleeve   H/O gastric bypass:  and       REVIEW OF SYSTEMS:Same previous  MEDICATIONS  (STANDING):  amLODIPine   Tablet 10 milliGRAM(s) Oral daily  cefTRIAXone   IVPB 1 Gram(s) IV Intermittent every 24 hours  heparin  Infusion.  Unit(s)/Hr (24 mL/Hr) IV Continuous <Continuous>  heparin  Injectable 36434 Unit(s) IV Push once  influenza   Vaccine 0.5 milliLiter(s) IntraMuscular once  levothyroxine 75 MICROGram(s) Oral daily  pantoprazole    Tablet 40 milliGRAM(s) Oral before breakfast  sodium chloride 0.9%. 1000 milliLiter(s) (150 mL/Hr) IV Continuous <Continuous>    MEDICATIONS  (PRN):  acetaminophen   Tablet 650 milliGRAM(s) Oral every 6 hours PRN For Temp greater than 38 C (100.4 F)  acetaminophen   Tablet. 650 milliGRAM(s) Oral every 6 hours PRN Mild Pain (1 - 3)  heparin  Injectable 87171 Unit(s) IV Push every 6 hours PRN For aPTT less than 40  heparin  Injectable 5000 Unit(s) IV Push every 6 hours PRN For aPTT between 40 - 57  ondansetron Injectable 4 milliGRAM(s) IV Push every 6 hours PRN Nausea      PE:Same Previous    Allergies    penicillin (Fever)  penicillins (Unknown)    Intolerances        SOCIAL HISTORY:Same previous    FAMILY HISTORY:  No pertinent family history in first degree relatives      Vital Signs Last 24 Hrs  T(C): 37.1 (09 Oct 2017 11:34), Max: 40.4 (08 Oct 2017 19:29)  T(F): 98.8 (09 Oct 2017 11:34), Max: 104.7 (08 Oct 2017 19:29)  HR: 102 (09 Oct 2017 11:34) (78 - 120)  BP: 106/59 (09 Oct 2017 11:34) (106/59 - 139/50)  BP(mean): --  RR: 16 (09 Oct 2017 11:34) (16 - 22)  SpO2: 99% (09 Oct 2017 11:34) (98% - 100%)    PHYSICAL EXAM:Same previous    LABS:                        11.6   9.6   )-----------( 106      ( 09 Oct 2017 07:13 )             33.2     10    135  |  104  |  12  ----------------------------<  104<H>  3.6   |  21<L>  |  1.16    Ca    7.7<L>      09 Oct 2017 07:13    TPro  6.9  /  Alb  3.3  /  TBili  1.4<H>  /  DBili  x   /  AST  21  /  ALT  19  /  AlkPhos  99  10-08    PT/INR - ( 09 Oct 2017 07:13 )   PT: 19.3 sec;   INR: 1.77 ratio         PTT - ( 09 Oct 2017 07:13 )  PTT:32.1 sec  Urinalysis Basic - ( 08 Oct 2017 21:38 )    Color: Yellow / Appearance: Slightly Turbid / S.015 / pH: x  Gluc: x / Ketone: Negative  / Bili: Negative / Urobili: Negative mg/dL   Blood: x / Protein: 15 mg/dL / Nitrite: Negative   Leuk Esterase: Moderate / RBC: 6-10 /HPF / WBC 26-50   Sq Epi: x / Non Sq Epi: Occasional / Bacteria: Few      Urine Culture:     RADIOLOGY & ADDITIONAL STUDIES:

## 2017-10-10 LAB
APTT BLD: 34.6 SEC — SIGNIFICANT CHANGE UP (ref 27.5–37.4)
APTT BLD: 71.9 SEC — HIGH (ref 27.5–37.4)
APTT BLD: 72.6 SEC — HIGH (ref 27.5–37.4)
HCT VFR BLD CALC: 32.3 % — LOW (ref 39–50)
HGB BLD-MCNC: 11 G/DL — LOW (ref 13–17)
MCHC RBC-ENTMCNC: 29.6 PG — SIGNIFICANT CHANGE UP (ref 27–34)
MCHC RBC-ENTMCNC: 34.1 GM/DL — SIGNIFICANT CHANGE UP (ref 32–36)
MCV RBC AUTO: 86.6 FL — SIGNIFICANT CHANGE UP (ref 80–100)
PLATELET # BLD AUTO: 109 K/UL — LOW (ref 150–400)
RBC # BLD: 3.73 M/UL — LOW (ref 4.2–5.8)
RBC # FLD: 12.5 % — SIGNIFICANT CHANGE UP (ref 10.3–14.5)
WBC # BLD: 5.8 K/UL — SIGNIFICANT CHANGE UP (ref 3.8–10.5)
WBC # FLD AUTO: 5.8 K/UL — SIGNIFICANT CHANGE UP (ref 3.8–10.5)

## 2017-10-10 PROCEDURE — 76770 US EXAM ABDO BACK WALL COMP: CPT | Mod: 26

## 2017-10-10 RX ORDER — AMLODIPINE BESYLATE 2.5 MG/1
2.5 TABLET ORAL DAILY
Qty: 0 | Refills: 0 | Status: DISCONTINUED | OUTPATIENT
Start: 2017-10-10 | End: 2017-10-11

## 2017-10-10 RX ADMIN — CEFTRIAXONE 100 GRAM(S): 500 INJECTION, POWDER, FOR SOLUTION INTRAMUSCULAR; INTRAVENOUS at 18:12

## 2017-10-10 RX ADMIN — HEPARIN SODIUM 10000 UNIT(S): 5000 INJECTION INTRAVENOUS; SUBCUTANEOUS at 09:52

## 2017-10-10 RX ADMIN — SODIUM CHLORIDE 100 MILLILITER(S): 9 INJECTION INTRAMUSCULAR; INTRAVENOUS; SUBCUTANEOUS at 23:23

## 2017-10-10 RX ADMIN — HEPARIN SODIUM 2400 UNIT(S)/HR: 5000 INJECTION INTRAVENOUS; SUBCUTANEOUS at 17:35

## 2017-10-10 RX ADMIN — Medication 400 MILLIGRAM(S): at 00:15

## 2017-10-10 RX ADMIN — HEPARIN SODIUM 2400 UNIT(S)/HR: 5000 INJECTION INTRAVENOUS; SUBCUTANEOUS at 09:53

## 2017-10-10 RX ADMIN — AMLODIPINE BESYLATE 10 MILLIGRAM(S): 2.5 TABLET ORAL at 05:16

## 2017-10-10 RX ADMIN — SODIUM CHLORIDE 100 MILLILITER(S): 9 INJECTION INTRAMUSCULAR; INTRAVENOUS; SUBCUTANEOUS at 05:16

## 2017-10-10 RX ADMIN — INFLUENZA VIRUS VACCINE 0.5 MILLILITER(S): 15; 15; 15; 15 SUSPENSION INTRAMUSCULAR at 12:08

## 2017-10-10 RX ADMIN — Medication 650 MILLIGRAM(S): at 18:19

## 2017-10-10 RX ADMIN — PANTOPRAZOLE SODIUM 40 MILLIGRAM(S): 20 TABLET, DELAYED RELEASE ORAL at 05:16

## 2017-10-10 RX ADMIN — Medication 75 MICROGRAM(S): at 05:16

## 2017-10-10 NOTE — PROGRESS NOTE ADULT - SUBJECTIVE AND OBJECTIVE BOX
Pt is a 61yo M w PMH of HTN, HLD, hypothyrodism, multiple DVT, PE, gastric sleeve surgery present to ER with c/o fever, chills and left flank pain. Pt states he started developed sharp left flank pain 3 days ago and [facility]. Pt was diagnosed with multiple kidney stones with largest 1.2cm, pt was given Cipro and naprosyn and was DCed home with follow up appointment with urology on Tue. Pt was instructed to go to ER if developed fever. Pt states today pt have fever and chills. Tmax was 104F. Pt has 8/10 sharp non radiating left flank pain, better with morphine. It is associated with nausea and dry heaving. Adm for furthe management    10/9: feels better, has an appetite this am; not in pain  10/10: febrile last night bl cx neg    · CONSTITUTIONAL: Well appearing, well nourished, awake, alert, oriented to person, place, time/situation and in no apparent distress.  · ENMT: Airway patent, Nasal mucosa clear. Mouth with normal mucosa. Throat has no vesicles, no oropharyngeal exudates and uvula is midline.  · EYES: Clear bilaterally, pupils equal, round and reactive to light.  · CARDIAC: Normal rate, regular rhythm.  Heart sounds S1, S2.  No murmurs, rubs or gallops.  · RESPIRATORY: Breath sounds clear and equal bilaterally.  · GASTROINTESTINAL: Abdomen soft, non-tender, no guarding.  · MUSCULOSKELETAL: Spine appears normal, range of motion is not limited, no muscle or joint tenderness    labs reviewed               A/P:    Sepsis  Nephrolithiasis  h/o VTE  Incidentaloma: splenomegaly    Plan:  - ceftriaxone  - repeat lactate improved  - IR eval requested per Dr Rodrigues re: PCN  - h/o VTE: dc DOAC for now and use UFH drip for poss procedure  - outpt f/up splenomegaly    case d/w Dr. Rodrigues; poss PCN in am if he is still febrile;  to decide if he needs the procedure

## 2017-10-10 NOTE — PROGRESS NOTE ADULT - PROBLEM SELECTOR PLAN 1
Continue antibiotics. If pt continues with fever, will need left pcn. Xeralto stopped and pt with heparin.

## 2017-10-10 NOTE — PROGRESS NOTE ADULT - SUBJECTIVE AND OBJECTIVE BOX
speCHIEF COMPLAINT: left stone    HISTORY OF PRESENT ILLNESS: Pt relatively comfortable. Had fever 102.6 last night. Lactate down and wbc improved and creat down. No cvat on exam. Currently npo for possible left pcn. Renal sono ordered. Pt frustrated. Explained need for abx and possible pcn. No procedure to remove stone indicated at this time in presence of infection. Would need pcn if fevers continue. Then he would ultimately be discharged home for possible future percutaneous nephrolithotomy vs ESWL. This is also explained to pt.    PAST MEDICAL & SURGICAL HISTORY:  Other hyperlipidemia  Pulmonary emboli  Acquired hypothyroidism  Hypertension  Alcohol abuse: sober since 2015  DVT (deep venous thrombosis), right: ankle  S/P cholecystectomy  History of tracheostomy: placed and reversed summer 2015  Feeding by G-tube: placed and reversed summer 2015  S/P hip replacement: b/l  History of gastric surgery: gastric sleeve   H/O gastric bypass:  and       REVIEW OF SYSTEMS:Same previous  MEDICATIONS  (STANDING):  amLODIPine   Tablet 10 milliGRAM(s) Oral daily  cefTRIAXone   IVPB 1 Gram(s) IV Intermittent every 24 hours  heparin  Infusion.  Unit(s)/Hr (24 mL/Hr) IV Continuous <Continuous>  influenza   Vaccine 0.5 milliLiter(s) IntraMuscular once  levothyroxine 75 MICROGram(s) Oral daily  pantoprazole    Tablet 40 milliGRAM(s) Oral before breakfast  sodium chloride 0.9%. 1000 milliLiter(s) (100 mL/Hr) IV Continuous <Continuous>    MEDICATIONS  (PRN):  acetaminophen   Tablet 650 milliGRAM(s) Oral every 6 hours PRN For Temp greater than 38 C (100.4 F)  acetaminophen   Tablet. 650 milliGRAM(s) Oral every 6 hours PRN Mild Pain (1 - 3)  heparin  Injectable 42796 Unit(s) IV Push every 6 hours PRN For aPTT less than 40  heparin  Injectable 5000 Unit(s) IV Push every 6 hours PRN For aPTT between 40 - 57  ondansetron Injectable 4 milliGRAM(s) IV Push every 6 hours PRN Nausea      PE:Same Previous    Allergies    penicillin (Fever)  penicillins (Unknown)    Intolerances        SOCIAL HISTORY:Same previous    FAMILY HISTORY:  No pertinent family history in first degree relatives      Vital Signs Last 24 Hrs  T(C): 37.2 (10 Oct 2017 05:00), Max: 39.3 (09 Oct 2017 21:28)  T(F): 98.9 (10 Oct 2017 05:00), Max: 102.8 (09 Oct 2017 21:28)  HR: 84 (10 Oct 2017 05:00) (84 - 107)  BP: 120/63 (10 Oct 2017 05:00) (106/59 - 126/54)  BP(mean): --  RR: 18 (10 Oct 2017 05:00) (16 - 18)  SpO2: 100% (10 Oct 2017 05:00) (97% - 100%)    PHYSICAL EXAM:Same previous    LABS:                        11.0   8.0   )-----------( 104      ( 09 Oct 2017 21:29 )             31.4     10    135  |  105  |  14  ----------------------------<  92  3.7   |  21<L>  |  1.04    Ca    7.6<L>      09 Oct 2017 21:29    TPro  6.9  /  Alb  3.3  /  TBili  1.4<H>  /  DBili  x   /  AST  21  /  ALT  19  /  AlkPhos  99  10-08    PT/INR - ( 09 Oct 2017 07:13 )   PT: 19.3 sec;   INR: 1.77 ratio         PTT - ( 10 Oct 2017 08:25 )  PTT:34.6 sec  Urinalysis Basic - ( 08 Oct 2017 21:38 )    Color: Yellow / Appearance: Slightly Turbid / S.015 / pH: x  Gluc: x / Ketone: Negative  / Bili: Negative / Urobili: Negative mg/dL   Blood: x / Protein: 15 mg/dL / Nitrite: Negative   Leuk Esterase: Moderate / RBC: 6-10 /HPF / WBC 26-50   Sq Epi: x / Non Sq Epi: Occasional / Bacteria: Few      Urine Culture:     RADIOLOGY & ADDITIONAL STUDIES:

## 2017-10-11 ENCOUNTER — TRANSCRIPTION ENCOUNTER (OUTPATIENT)
Age: 62
End: 2017-10-11

## 2017-10-11 VITALS
DIASTOLIC BLOOD PRESSURE: 54 MMHG | HEART RATE: 80 BPM | OXYGEN SATURATION: 99 % | RESPIRATION RATE: 19 BRPM | SYSTOLIC BLOOD PRESSURE: 115 MMHG | TEMPERATURE: 99 F

## 2017-10-11 LAB
APTT BLD: 63.7 SEC — HIGH (ref 27.5–37.4)
HCT VFR BLD CALC: 36.6 % — LOW (ref 39–50)
HGB BLD-MCNC: 12.7 G/DL — LOW (ref 13–17)
MCHC RBC-ENTMCNC: 29.8 PG — SIGNIFICANT CHANGE UP (ref 27–34)
MCHC RBC-ENTMCNC: 34.7 GM/DL — SIGNIFICANT CHANGE UP (ref 32–36)
MCV RBC AUTO: 85.8 FL — SIGNIFICANT CHANGE UP (ref 80–100)
PLATELET # BLD AUTO: 162 K/UL — SIGNIFICANT CHANGE UP (ref 150–400)
RBC # BLD: 4.27 M/UL — SIGNIFICANT CHANGE UP (ref 4.2–5.8)
RBC # FLD: 12.2 % — SIGNIFICANT CHANGE UP (ref 10.3–14.5)
WBC # BLD: 6.3 K/UL — SIGNIFICANT CHANGE UP (ref 3.8–10.5)
WBC # FLD AUTO: 6.3 K/UL — SIGNIFICANT CHANGE UP (ref 3.8–10.5)

## 2017-10-11 RX ORDER — CEFUROXIME AXETIL 250 MG
1 TABLET ORAL
Qty: 14 | Refills: 0
Start: 2017-10-11 | End: 2017-10-18

## 2017-10-11 RX ADMIN — SODIUM CHLORIDE 100 MILLILITER(S): 9 INJECTION INTRAMUSCULAR; INTRAVENOUS; SUBCUTANEOUS at 08:28

## 2017-10-11 RX ADMIN — HEPARIN SODIUM 2400 UNIT(S)/HR: 5000 INJECTION INTRAVENOUS; SUBCUTANEOUS at 00:06

## 2017-10-11 NOTE — DISCHARGE NOTE ADULT - CARE PLAN
Principal Discharge DX:	Kidney stone  Goal:	to remove it  Instructions for follow-up, activity and diet:	same as before; [facility] see the reports I printed or f/up with Sr. Rodrigues. You have an enlarged spleen take this report to your PCP

## 2017-10-11 NOTE — DISCHARGE NOTE ADULT - HOSPITAL COURSE
Pt is a 63yo M w PMH of HTN, HLD, hypothyrodism, multiple DVT, PE, gastric sleeve surgery present to ER with c/o fever, chills and left flank pain. Pt states he started developed sharp left flank pain 3 days ago and went to [facility]. Pt was diagnosed with multiple kidney stones with largest 1.2cm, pt was given Cipro and naprosyn and was DCed home with follow up appointment with urology on Tue. Pt was instructed to go to ER if developed fever. Pt states today pt have fever and chills. Tmax was 104F. Pt has 8/10 sharp non radiating left flank pain, better with morphine. It is associated with nausea and dry heaving. Adm for further management      · CONSTITUTIONAL: Well appearing, well nourished, awake, alert, oriented to person, place, time/situation and in no apparent distress.  · ENMT: Airway patent, Nasal mucosa clear. Mouth with normal mucosa. Throat has no vesicles, no oropharyngeal exudates and uvula is midline.  · EYES: Clear bilaterally, pupils equal, round and reactive to light.  · CARDIAC: Normal rate, regular rhythm.  Heart sounds S1, S2.  No murmurs, rubs or gallops.  · RESPIRATORY: Breath sounds clear and equal bilaterally.  · GASTROINTESTINAL: Abdomen soft, non-tender, no guarding.  · MUSCULOSKELETAL: Spine appears normal, range of motion is not limited, no muscle or joint tenderness    labs reviewed               A/P:    Sepsis  Nephrolithiasis  h/o VTE  Incidentaloma: splenomegaly    Plan:  - d/c home on ceftin  - h/o VTE: resume DOAC   - outpt f/up splenomegaly    case d/w Dr. Rodrigues and IR Dr. Gray: not a candidate for PCN due to lack of fever or signs of ongoing infection

## 2017-10-11 NOTE — DISCHARGE NOTE ADULT - MEDICATION SUMMARY - MEDICATIONS TO TAKE
I will START or STAY ON the medications listed below when I get home from the hospital:    Xarelto 20 mg oral tablet  -- 1 tab(s) by mouth once a day (in the evening)  -- Indication: For .    amLODIPine 10 mg oral tablet  -- 1 tab(s) by mouth once a day  -- Indication: For .    Ceftin 250 mg oral tablet  -- 1 tab(s) by mouth every 12 hours   -- Finish all this medication unless otherwise directed by prescriber.  Medication should be taken with plenty of water.  Take with food or milk.    -- Indication: For to call it in    famotidine 20 mg oral tablet, disintegrating  --  by mouth 2 times a day  -- Indication: For .    omeprazole 20 mg oral delayed release capsule  -- 1 cap(s) by mouth once a day  -- Indication: For .    levothyroxine 75 mcg (0.075 mg) oral tablet  -- 1 tab(s) by mouth once a day  -- Indication: For .    Calcium 500+D oral tablet, chewable  -- 1 tab(s) by mouth 2 times a day  -- Indication: For .

## 2017-10-11 NOTE — DISCHARGE NOTE ADULT - OTHER SIGNIFICANT FINDINGS
Spleen: Enlarged, measuring 13.8 x 18.3 x 6 cm with punctate   calcifications likely related to previous granulomatous disease.  Gallbladder: Cholecystectomy.  Biliary tree: Unremarkable.  Pancreas: Unremarkable.  Adrenal glands: Unremarkable.    Kidneys: There is mild left renal pelvic fullness related to a 1 cm stone   at the ureteropelvic junction. There is a cleft in the center of the   stone. There is no right hydronephrosis. There are stones in the right   lower pole calyx measuring up to 4 mm.    Urinary bladder: Numerous posterior bladder diverticula.    Pelvic organs: The prostate is not well visualized due to streak artifact   from the patient's bilateral hip arthroplasty.    Bowel:  There is no small bowel obstruction.  Status post gastric bypass.   There is no small bowel obstruction or evidence for an internal hernia.   The appendix is unremarkable. There is scattered diverticulosis.    Vasculature: The aorta is not dilated. There is mild atherosclerotic   gastric calcifications.  There is no significant adenopathy.  Bones: The patient is status post bilateral total hip arthroplasty.  Subcutaneous tissues: Unremarkable.    FOR YOUR PCP AND UROLOGIST

## 2017-10-11 NOTE — DISCHARGE NOTE ADULT - PLAN OF CARE
to remove it same as before; f/[facility] as [facility] see the reports I printed or f/up with Sr. Rodrigues. You have an enlarged spleen take this report to your PCP

## 2017-10-13 DIAGNOSIS — E87.1 HYPO-OSMOLALITY AND HYPONATREMIA: ICD-10-CM

## 2017-10-13 DIAGNOSIS — N20.0 CALCULUS OF KIDNEY: ICD-10-CM

## 2017-10-13 DIAGNOSIS — E78.5 HYPERLIPIDEMIA, UNSPECIFIED: ICD-10-CM

## 2017-10-13 DIAGNOSIS — I10 ESSENTIAL (PRIMARY) HYPERTENSION: ICD-10-CM

## 2017-10-13 DIAGNOSIS — N39.0 URINARY TRACT INFECTION, SITE NOT SPECIFIED: ICD-10-CM

## 2017-10-13 DIAGNOSIS — Z86.718 PERSONAL HISTORY OF OTHER VENOUS THROMBOSIS AND EMBOLISM: ICD-10-CM

## 2017-10-13 DIAGNOSIS — N17.9 ACUTE KIDNEY FAILURE, UNSPECIFIED: ICD-10-CM

## 2017-10-13 DIAGNOSIS — A41.9 SEPSIS, UNSPECIFIED ORGANISM: ICD-10-CM

## 2017-10-13 DIAGNOSIS — E87.6 HYPOKALEMIA: ICD-10-CM

## 2017-10-13 DIAGNOSIS — E03.9 HYPOTHYROIDISM, UNSPECIFIED: ICD-10-CM

## 2017-10-14 LAB
CULTURE RESULTS: SIGNIFICANT CHANGE UP
CULTURE RESULTS: SIGNIFICANT CHANGE UP
SPECIMEN SOURCE: SIGNIFICANT CHANGE UP
SPECIMEN SOURCE: SIGNIFICANT CHANGE UP

## 2019-03-12 NOTE — ED STATDOCS - PROGRESS NOTE DETAILS
Procedure Date: 03/11/2019      PREOPERATIVE DIAGNOSIS:  Post-intubation tracheal stenosis.      PROCEDURE:     1.  Tracheal resection and reconstruction via a transcervical approach.   2.  Flexible bronchoscopy.      SURGEON:  Ryan Estrella MD (I am dictating as cosurgeon with Dr. Nick Beaulieu, this was a very challenging operation that required both of our involvement at all times during the case).      RESIDENT SURGEON:  Yue Wilcox MD.      ANESTHESIA:  General.      ESTIMATED BLOOD LOSS:  50 mL.      COMPLICATIONS:  None immediate.      FINDINGS:  We resected 6 rings (approximately 3-3.5 cm).  Dr. Beaulieu did a hyoid release to reduce tension.  At the end, the anastomosis was airtight and there was a tiny amount of bubbling from a needle hole that we did not attempt to fix.      BRIEF  HISTORY:  Ms. Song is a 49-year-old female with a tracheal stenosis that based on preoperative assessment is about 3 cm distal to the cricoid.  We decided we would perform the procedure jointly because she would likely require some hyoid release.      INDICATIONS:  Risks and benefits of the procedure were described at length to the patient and her , who had all of their questions answered and agreed to proceed.      DESCRIPTION OF PROCEDURE:  With the patient in the supine position under general anesthesia, we performed a flexible bronchoscopy to again get a sense of the distance of the stricture from the cricoid, and also the length of the stricture.  It appeared to be about 3 cm distal to the cricoid on bronchoscopy.  We then made a generous neck incision and dissected down very carefully to the pretracheal plane, and then dissected down anteriorly to the trachea until we encountered an area with dense peritracheal scarring and inflammation.  This area was about 3 cm below the cricoid.  We dissected that free and then with bronchoscopy, we determined that that was the area of the stricture.  Once we had passed  the stricture, I was able to bluntly dissect down in front of the trachea and along both main stem bronchi with my finger to add some additional length.  Due to the length of the stricture, we decided to do a hyoid release with Dr. Nick Beaulieu (please see his operative report).      Once we had the trachea dissected free, we circumferentially dissected the area of the stricture and were extremely cautious with staying right on the trachea and using no energy to prevent injury to the recurrent nerves, particularly on the left, since we were below the right recurrent nerve.  We then transected the trachea and excised about 3 cm in length, which was a total of about 6 rings.  We then had perfectly normal-appearing tissue on both ends.  We provided cross-table ventilation to the distal trachea.  We placed retention sutures on both sides that we used to bring the ends closer together while we were suturing.  We first placed two corner cartilage stitches, left them loose and then 4 interrupted membranous stitches.  Then, while we were holding tension on the retention sutures, we tied down the 2 cartilage corner stitches and all the membranous ones.  Following this, we proceeded with another cartilage stitch right next to the corner and also tightened that and kept monitoring tension with the retention sutures.  Finally, we placed several anterior 3-0 Vicryl interrupted stitches and then eventually tied them all together and readvanced the endotracheal tube from the top.  Once we had done this, we pulled the tube back sufficiently so we could inflate the balloon in the subglottic area and insufflated with a pressure of 30 under emersion.  There was one tiny area of bubbling that was from a needle hole, and we decided not to try to fix that.      We then ensured hemostasis and closed with absorbable sutures in the conventional fashion and placed a Penrose drain (please refer to the operative report from the ENT Surgery  team).      Finally, at the end of the procedure, we performed another bronchoscopy, examined our anastomosis from the inside and it looked intact, and then positioned the endotracheal tube in such a way that the cuff was distal to the anastomosis.  We then placed a single #5 Ethibond stitch from the sternum to the periosteum of the chin, keeping the neck in a neutral position.      The patient tolerated the procedure well.         CABRERA WOODRUFF MD             D: 2019   T: 2019   MT: ANITA      Name:     ANA GARCIA   MRN:      [id number]        Account:        [id number]   :      1969           Procedure Date: 2019      Document: [id number]       cc: [facility] Surgery Billing     63 y/o male with PMHx of EtOH abuse, HTN, DVT presents to the ED c/o fever.  He was dx with left sided [facility]. (+) nausea, (+) vomiting.  Will transfer pt to main ED for further evaluation.

## 2020-01-24 PROBLEM — E03.9 HYPOTHYROIDISM, UNSPECIFIED: Chronic | Status: ACTIVE | Noted: 2017-10-08

## 2020-01-24 PROBLEM — I10 ESSENTIAL (PRIMARY) HYPERTENSION: Chronic | Status: ACTIVE | Noted: 2017-10-08

## 2020-01-24 PROBLEM — E78.4 OTHER HYPERLIPIDEMIA: Chronic | Status: ACTIVE | Noted: 2017-10-08

## 2020-01-24 PROBLEM — I26.99 OTHER PULMONARY EMBOLISM WITHOUT ACUTE COR PULMONALE: Chronic | Status: ACTIVE | Noted: 2017-10-08

## 2020-02-03 ENCOUNTER — APPOINTMENT (OUTPATIENT)
Dept: VASCULAR SURGERY | Facility: CLINIC | Age: 65
End: 2020-02-03
Payer: MEDICARE

## 2020-02-03 PROCEDURE — 99205 OFFICE O/P NEW HI 60 MIN: CPT

## 2020-02-03 PROCEDURE — 93971 EXTREMITY STUDY: CPT

## 2020-02-04 NOTE — HISTORY OF PRESENT ILLNESS
[FreeTextEntry1] : 63 y/o M with hx of HLD, HTN, BLE lymphedema and DVTs, presents here today for initial evaluation of BLE edema. Patient was last seen over 10 years ago. He reports that his first DVT was in the 1990s. Subsequently in 2015 after experiencing a seizure 2/2 alcohol withdrawal syndrome, he was hospitalized for 6 months. For 2 months during his hospitalization, he states that he was in a coma. He reports that another DVT was found after his hospitalization. He has been started on Xarelto since. He is currently using a lymphedema pump 3 times daily for an hour each time. He states that he has not been wearing compression stockings as it does not provide relief to his swelling and it makes his feet numb. There is also skin changes noted to his R medial ankle.

## 2020-02-04 NOTE — PROCEDURE
[FreeTextEntry1] : RLE venous duplex shows chronic DVT with recanalization noted in the popliteal vein. Reflux noted in the femoral vein to popliteal vein. There is also GSV reflux as well as VV noted in the thigh and calf.

## 2020-02-04 NOTE — ADDENDUM
[FreeTextEntry1] : Documented by Donavon Ross acting as a scribe for Dr. Norma Khan on 02/03/2020\par

## 2020-02-04 NOTE — END OF VISIT
[FreeTextEntry3] : All medical record entries made by the Scribe were at my, Dr. Khan's direction and personally dictated by me on 02/03/2020 . I have reviewed the chart and agree that the record accurately reflects my personal performance of the history, physical exam, assessment and plan. I have also personally directed, reviewed, and agreed with the chart.\par

## 2020-02-04 NOTE — PHYSICAL EXAM
[Respiratory Effort] : normal respiratory effort [Normal Breath Sounds] : Normal breath sounds [Normal Heart Sounds] : normal heart sounds [Ankle Swelling Bilaterally] : bilaterally  [Ankle Swelling (On Exam)] : present [Varicose Veins Of The Right Leg] : of the right leg [Varicose Veins Of Lower Extremities] : present [Ankle Swelling On The Right] : mild [] : present [Ankle Swelling On The Left] : moderate [Calm] : calm [Alert] : alert [JVD] : no jugular venous distention  [de-identified] : Well appearing, NAD, obese habitus [de-identified] : NCAT [de-identified] : Ambulating with cane [de-identified] : hyperpigmentation over RLE

## 2020-02-04 NOTE — ASSESSMENT
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 Patient is doing well today. RLE venous duplex shows chronic DVT with recanalization noted in the popliteal vein. Reflux noted in the femoral vein to popliteal vein. There is also GSV reflux as well as VV noted in the thigh and calf. On exam, the BLE appears swollen with notable skin changes to the medial aspect of RLE.\par Recommended the patient to begin wearing compression stockings to prevent ulcerations. \par To follow up PRN.

## 2020-04-09 NOTE — PATIENT PROFILE ADULT. - NS SC CAGE ALCOHOL CUT DOWN
Met with patient and his nurse with provider via video conference.  Patient reports having some side effects from the meds so wants to taper off them and try therapy for now and will follow up with his Dr in 10 days and decide if he wants to start back on some kind of med.  He would like to discharge tomorrow - will try to set things up for transportation.    Spoke later with the patient - he is not able to rent a car from the [place] airport so will need a ride to [place] - initially he asked about Uber and wanted to set up an Uber ride - states to do this, he would need to have his phone, set up the ride and the be ready to discharge at an unknown time whenever someone accepted the Uber request - let him know we need something more definite than that - discussed getting a taxi- after reviewing the cost - he states the cost of the taxi is about the same as an Uber ride to [place] so he is fine with just paying for a taxi to [place] as he does not want to take the bus even though the bus is available.  He would like to leave by 10 a.m. tomorrow morning.  Contact [employer] and they can transport at 10 a.m.   no

## 2021-02-07 ENCOUNTER — INPATIENT (INPATIENT)
Facility: HOSPITAL | Age: 66
LOS: 1 days | Discharge: HOME | DRG: 537 | End: 2021-02-09
Attending: INTERNAL MEDICINE
Payer: MEDICARE

## 2021-02-07 VITALS
SYSTOLIC BLOOD PRESSURE: 135 MMHG | OXYGEN SATURATION: 97 % | TEMPERATURE: 98 F | HEIGHT: 72 IN | HEART RATE: 73 BPM | DIASTOLIC BLOOD PRESSURE: 67 MMHG | WEIGHT: 250 LBS | RESPIRATION RATE: 18 BRPM

## 2021-02-07 DIAGNOSIS — Z93.0 TRACHEOSTOMY STATUS: Chronic | ICD-10-CM

## 2021-02-07 DIAGNOSIS — S89.92XA UNSPECIFIED INJURY OF LEFT LOWER LEG, INITIAL ENCOUNTER: ICD-10-CM

## 2021-02-07 DIAGNOSIS — Z93.1 GASTROSTOMY STATUS: Chronic | ICD-10-CM

## 2021-02-07 DIAGNOSIS — Z90.49 ACQUIRED ABSENCE OF OTHER SPECIFIED PARTS OF DIGESTIVE TRACT: Chronic | ICD-10-CM

## 2021-02-07 DIAGNOSIS — Z96.60 PRESENCE OF UNSPECIFIED ORTHOPEDIC JOINT IMPLANT: Chronic | ICD-10-CM

## 2021-02-07 DIAGNOSIS — Z98.84 BARIATRIC SURGERY STATUS: Chronic | ICD-10-CM

## 2021-02-07 DIAGNOSIS — Z98.89 OTHER SPECIFIED POSTPROCEDURAL STATES: Chronic | ICD-10-CM

## 2021-02-07 LAB
ALBUMIN SERPL ELPH-MCNC: 3.4 G/DL — SIGNIFICANT CHANGE UP (ref 3.3–5)
ALP SERPL-CCNC: 91 U/L — SIGNIFICANT CHANGE UP (ref 40–120)
ALT FLD-CCNC: 30 U/L — SIGNIFICANT CHANGE UP (ref 12–78)
ANION GAP SERPL CALC-SCNC: 7 MMOL/L — SIGNIFICANT CHANGE UP (ref 5–17)
APPEARANCE UR: ABNORMAL
AST SERPL-CCNC: 26 U/L — SIGNIFICANT CHANGE UP (ref 15–37)
BASOPHILS # BLD AUTO: 0.01 K/UL — SIGNIFICANT CHANGE UP (ref 0–0.2)
BASOPHILS NFR BLD AUTO: 0.1 % — SIGNIFICANT CHANGE UP (ref 0–2)
BILIRUB SERPL-MCNC: 0.7 MG/DL — SIGNIFICANT CHANGE UP (ref 0.2–1.2)
BILIRUB UR-MCNC: NEGATIVE — SIGNIFICANT CHANGE UP
BUN SERPL-MCNC: 11 MG/DL — SIGNIFICANT CHANGE UP (ref 7–23)
CALCIUM SERPL-MCNC: 9.3 MG/DL — SIGNIFICANT CHANGE UP (ref 8.5–10.1)
CHLORIDE SERPL-SCNC: 107 MMOL/L — SIGNIFICANT CHANGE UP (ref 96–108)
CO2 SERPL-SCNC: 24 MMOL/L — SIGNIFICANT CHANGE UP (ref 22–31)
COLOR SPEC: YELLOW — SIGNIFICANT CHANGE UP
CREAT SERPL-MCNC: 1.02 MG/DL — SIGNIFICANT CHANGE UP (ref 0.5–1.3)
DIFF PNL FLD: ABNORMAL
EOSINOPHIL # BLD AUTO: 0.08 K/UL — SIGNIFICANT CHANGE UP (ref 0–0.5)
EOSINOPHIL NFR BLD AUTO: 1.2 % — SIGNIFICANT CHANGE UP (ref 0–6)
ETHANOL SERPL-MCNC: <10 MG/DL — SIGNIFICANT CHANGE UP (ref 0–10)
GLUCOSE SERPL-MCNC: 91 MG/DL — SIGNIFICANT CHANGE UP (ref 70–99)
GLUCOSE UR QL: NEGATIVE MG/DL — SIGNIFICANT CHANGE UP
HCT VFR BLD CALC: 40.8 % — SIGNIFICANT CHANGE UP (ref 39–50)
HGB BLD-MCNC: 13.3 G/DL — SIGNIFICANT CHANGE UP (ref 13–17)
IMM GRANULOCYTES NFR BLD AUTO: 0.4 % — SIGNIFICANT CHANGE UP (ref 0–1.5)
KETONES UR-MCNC: NEGATIVE — SIGNIFICANT CHANGE UP
LACTATE SERPL-SCNC: 2 MMOL/L — SIGNIFICANT CHANGE UP (ref 0.7–2)
LEUKOCYTE ESTERASE UR-ACNC: ABNORMAL
LIDOCAIN IGE QN: 72 U/L — LOW (ref 73–393)
LYMPHOCYTES # BLD AUTO: 1.63 K/UL — SIGNIFICANT CHANGE UP (ref 1–3.3)
LYMPHOCYTES # BLD AUTO: 23.8 % — SIGNIFICANT CHANGE UP (ref 13–44)
MCHC RBC-ENTMCNC: 30.9 PG — SIGNIFICANT CHANGE UP (ref 27–34)
MCHC RBC-ENTMCNC: 32.6 GM/DL — SIGNIFICANT CHANGE UP (ref 32–36)
MCV RBC AUTO: 94.9 FL — SIGNIFICANT CHANGE UP (ref 80–100)
MONOCYTES # BLD AUTO: 0.75 K/UL — SIGNIFICANT CHANGE UP (ref 0–0.9)
MONOCYTES NFR BLD AUTO: 11 % — SIGNIFICANT CHANGE UP (ref 2–14)
NEUTROPHILS # BLD AUTO: 4.34 K/UL — SIGNIFICANT CHANGE UP (ref 1.8–7.4)
NEUTROPHILS NFR BLD AUTO: 63.5 % — SIGNIFICANT CHANGE UP (ref 43–77)
NITRITE UR-MCNC: POSITIVE
PH UR: 5 — SIGNIFICANT CHANGE UP (ref 5–8)
PLATELET # BLD AUTO: 181 K/UL — SIGNIFICANT CHANGE UP (ref 150–400)
POTASSIUM SERPL-MCNC: 4.2 MMOL/L — SIGNIFICANT CHANGE UP (ref 3.5–5.3)
POTASSIUM SERPL-SCNC: 4.2 MMOL/L — SIGNIFICANT CHANGE UP (ref 3.5–5.3)
PROT SERPL-MCNC: 7 GM/DL — SIGNIFICANT CHANGE UP (ref 6–8.3)
PROT UR-MCNC: NEGATIVE MG/DL — SIGNIFICANT CHANGE UP
RBC # BLD: 4.3 M/UL — SIGNIFICANT CHANGE UP (ref 4.2–5.8)
RBC # FLD: 13.7 % — SIGNIFICANT CHANGE UP (ref 10.3–14.5)
SARS-COV-2 RNA SPEC QL NAA+PROBE: SIGNIFICANT CHANGE UP
SODIUM SERPL-SCNC: 138 MMOL/L — SIGNIFICANT CHANGE UP (ref 135–145)
SP GR SPEC: 1.01 — SIGNIFICANT CHANGE UP (ref 1.01–1.02)
UROBILINOGEN FLD QL: 1 MG/DL
WBC # BLD: 6.84 K/UL — SIGNIFICANT CHANGE UP (ref 3.8–10.5)
WBC # FLD AUTO: 6.84 K/UL — SIGNIFICANT CHANGE UP (ref 3.8–10.5)

## 2021-02-07 PROCEDURE — G0008: CPT

## 2021-02-07 PROCEDURE — U0005: CPT

## 2021-02-07 PROCEDURE — 73090 X-RAY EXAM OF FOREARM: CPT | Mod: 26,RT

## 2021-02-07 PROCEDURE — 70450 CT HEAD/BRAIN W/O DYE: CPT | Mod: 26

## 2021-02-07 PROCEDURE — 90686 IIV4 VACC NO PRSV 0.5 ML IM: CPT

## 2021-02-07 PROCEDURE — 99223 1ST HOSP IP/OBS HIGH 75: CPT

## 2021-02-07 PROCEDURE — 72125 CT NECK SPINE W/O DYE: CPT | Mod: 26

## 2021-02-07 PROCEDURE — 86803 HEPATITIS C AB TEST: CPT

## 2021-02-07 PROCEDURE — 73130 X-RAY EXAM OF HAND: CPT | Mod: 26,RT

## 2021-02-07 PROCEDURE — U0003: CPT

## 2021-02-07 PROCEDURE — 93970 EXTREMITY STUDY: CPT

## 2021-02-07 PROCEDURE — 97162 PT EVAL MOD COMPLEX 30 MIN: CPT | Mod: GP

## 2021-02-07 PROCEDURE — 73110 X-RAY EXAM OF WRIST: CPT | Mod: 26,50

## 2021-02-07 PROCEDURE — 74177 CT ABD & PELVIS W/CONTRAST: CPT | Mod: 26

## 2021-02-07 PROCEDURE — 71260 CT THORAX DX C+: CPT | Mod: 26

## 2021-02-07 PROCEDURE — 73562 X-RAY EXAM OF KNEE 3: CPT | Mod: 26,LT

## 2021-02-07 PROCEDURE — 36415 COLL VENOUS BLD VENIPUNCTURE: CPT

## 2021-02-07 PROCEDURE — 93970 EXTREMITY STUDY: CPT | Mod: 26

## 2021-02-07 PROCEDURE — 97530 THERAPEUTIC ACTIVITIES: CPT | Mod: GP

## 2021-02-07 PROCEDURE — 97116 GAIT TRAINING THERAPY: CPT | Mod: GP

## 2021-02-07 RX ORDER — ONDANSETRON 8 MG/1
4 TABLET, FILM COATED ORAL EVERY 6 HOURS
Refills: 0 | Status: DISCONTINUED | OUTPATIENT
Start: 2021-02-07 | End: 2021-02-09

## 2021-02-07 RX ORDER — PANTOPRAZOLE SODIUM 20 MG/1
40 TABLET, DELAYED RELEASE ORAL DAILY
Refills: 0 | Status: DISCONTINUED | OUTPATIENT
Start: 2021-02-07 | End: 2021-02-09

## 2021-02-07 RX ORDER — FAMOTIDINE 10 MG/ML
20 INJECTION INTRAVENOUS DAILY
Refills: 0 | Status: DISCONTINUED | OUTPATIENT
Start: 2021-02-07 | End: 2021-02-09

## 2021-02-07 RX ORDER — MORPHINE SULFATE 50 MG/1
4 CAPSULE, EXTENDED RELEASE ORAL ONCE
Refills: 0 | Status: DISCONTINUED | OUTPATIENT
Start: 2021-02-07 | End: 2021-02-07

## 2021-02-07 RX ORDER — NITROFURANTOIN MACROCRYSTAL 50 MG
100 CAPSULE ORAL ONCE
Refills: 0 | Status: COMPLETED | OUTPATIENT
Start: 2021-02-07 | End: 2021-02-07

## 2021-02-07 RX ORDER — MORPHINE SULFATE 50 MG/1
4 CAPSULE, EXTENDED RELEASE ORAL EVERY 4 HOURS
Refills: 0 | Status: DISCONTINUED | OUTPATIENT
Start: 2021-02-07 | End: 2021-02-09

## 2021-02-07 RX ORDER — ACETAMINOPHEN 500 MG
1000 TABLET ORAL ONCE
Refills: 0 | Status: COMPLETED | OUTPATIENT
Start: 2021-02-07 | End: 2021-02-07

## 2021-02-07 RX ORDER — NITROFURANTOIN MACROCRYSTAL 50 MG
1 CAPSULE ORAL
Qty: 20 | Refills: 0
Start: 2021-02-07 | End: 2021-02-16

## 2021-02-07 RX ORDER — LEVOTHYROXINE SODIUM 125 MCG
75 TABLET ORAL DAILY
Refills: 0 | Status: DISCONTINUED | OUTPATIENT
Start: 2021-02-07 | End: 2021-02-09

## 2021-02-07 RX ORDER — AMLODIPINE BESYLATE 2.5 MG/1
10 TABLET ORAL DAILY
Refills: 0 | Status: DISCONTINUED | OUTPATIENT
Start: 2021-02-07 | End: 2021-02-09

## 2021-02-07 RX ORDER — RIVAROXABAN 15 MG-20MG
20 KIT ORAL
Refills: 0 | Status: DISCONTINUED | OUTPATIENT
Start: 2021-02-07 | End: 2021-02-09

## 2021-02-07 RX ORDER — SODIUM CHLORIDE 9 MG/ML
3 INJECTION INTRAMUSCULAR; INTRAVENOUS; SUBCUTANEOUS ONCE
Refills: 0 | Status: COMPLETED | OUTPATIENT
Start: 2021-02-07 | End: 2021-02-07

## 2021-02-07 RX ORDER — ACETAMINOPHEN 500 MG
650 TABLET ORAL EVERY 6 HOURS
Refills: 0 | Status: DISCONTINUED | OUTPATIENT
Start: 2021-02-07 | End: 2021-02-09

## 2021-02-07 RX ORDER — CIPROFLOXACIN LACTATE 400MG/40ML
500 VIAL (ML) INTRAVENOUS EVERY 12 HOURS
Refills: 0 | Status: DISCONTINUED | OUTPATIENT
Start: 2021-02-07 | End: 2021-02-08

## 2021-02-07 RX ADMIN — SODIUM CHLORIDE 3 MILLILITER(S): 9 INJECTION INTRAMUSCULAR; INTRAVENOUS; SUBCUTANEOUS at 14:54

## 2021-02-07 RX ADMIN — Medication 100 MILLIGRAM(S): at 11:52

## 2021-02-07 RX ADMIN — MORPHINE SULFATE 4 MILLIGRAM(S): 50 CAPSULE, EXTENDED RELEASE ORAL at 09:48

## 2021-02-07 RX ADMIN — MORPHINE SULFATE 4 MILLIGRAM(S): 50 CAPSULE, EXTENDED RELEASE ORAL at 21:21

## 2021-02-07 RX ADMIN — Medication 500 MILLIGRAM(S): at 18:05

## 2021-02-07 NOTE — ED ADULT NURSE NOTE - CHIEF COMPLAINT QUOTE
Pt. to the ED C/O Head, Right wrist and left Pain, Back Pain and Left Knee Pain S/P Fall yesterday at 6pm- Pt. states he tripped over Groceries- Denies LOC- + Cardiac Hx. on Xarelto- TA to ED Called by EMS RN @ 805am -- [facility]

## 2021-02-07 NOTE — H&P ADULT - HISTORY OF PRESENT ILLNESS
HPI: The patient is an :  66 yo male with Hx. of DVT and PE on Xarelto, HTN, HLD, Hypothyroid, ETOH abuse ( sober since ), fell yesterday outside , landed backward and hit head, L knee and L wrist. No LOC, c/o pain today ,not able to ambulate.      PMHx:DVT and PE on Xarelto, HTN, HLD, Hypothyroid, ETOH abuse ,The patient is sober since  when he had a withdrawal seizure,     PSHx: G tube, Gastric bypass  and , Tracheostomy , , cass TKR, Cholecystectomy    Family Hx: Father  from prostate CA, mother diet from stomach CA and had CAD/CABG    Social Hx.: not smoking, no alcohol use    ROS: as in HPI  Eyes: no changes in vision    ENT/Mouth: no changes    Cardiovascular: no chest pain    Respiratory: no SOB    Gastrointestinal: no diarrhea, no nausea, no vomiting    Genitourinary: no dysuria    Breast: no pain    Musculoskeletal: pain in both  wrists , pain in L knee,     Integumentary: no itching    Neurological: No Headache, no tremor,    Psychiatric: no suicidal ideations    Endocrine: no excessive thirst,     Hematologic/Lymphatic: no swollen glands    Allergic/Immunologic: no itching      Physical Exam: Vital Signs Last 24 Hrs  T(C): 36.4 (2021 15:34), Max: 36.8 (2021 08:07)  T(F): 97.5 (2021 15:34), Max: 98.2 (2021 08:07)  HR: 78 (2021 15:34) (69 - 78)  BP: 111/52 (2021 15:34) (111/52 - 135/67)  BP(mean): --  RR: 17 (2021 15:34) (17 - 18)  SpO2: 96% (2021 15:34) (96% - 98%)        HEENT: PRRL EOMI    MOUTH/TEETH/GUMS: Clear    NECK: no JVD    LUNGS: Clear    HEART: S1,S2 RR    ABDOMEN: soft nontender    EXTREMITIES:  no pedal edema    MUSCULOSKELETAL: L knee swelling, mild effusion palpable pain with mobilization, bilateral wrist pain with mobilization    NEURO: no tremor, no focal signs.    SKIN: no rash    : CVA negative,     Lab:                       13.3   6.84  )-----------( 181      ( 2021 08:41 )             40.8       02-07    138  |  107  |  11  ----------------------------<  91  4.2   |  24  |  1.02    Ca    9.3      2021 08:41    TPro  7.0  /  Alb  3.4  /  TBili  0.7  /  DBili  x   /  AST  26  /  ALT  30  /  AlkPhos  91  -07   EKG NSR , RBBB,       HPI: The patient is an :  64 yo male with Hx. of DVT and PE on Xarelto, HTN, HLD, Hypothyroid, ETOH abuse ( sober since ), fell yesterday outside , landed backward and hit head, L knee and L wrist. No LOC, c/o pain today ,not able to ambulate.      PMHx:DVT and PE on Xarelto, HTN, HLD, Hypothyroid, ETOH abuse ,The patient is sober since  when he had a withdrawal seizure,     PSHx: G tube, Gastric bypass  and , Tracheostomy , , cass TKR, Cholecystectomy    Family Hx: Father  from prostate CA, mother diet from stomach CA and had CAD/CABG    Social Hx.: not smoking, no alcohol use    ROS: as in HPI  Eyes: no changes in vision    ENT/Mouth: no changes    Cardiovascular: no chest pain    Respiratory: no SOB    Gastrointestinal: no diarrhea, no nausea, no vomiting    Genitourinary: no dysuria    Breast: no pain    Musculoskeletal: pain in both  wrists , pain in L knee,     Integumentary: no itching    Neurological: No Headache, no tremor,    Psychiatric: no suicidal ideations    Endocrine: no excessive thirst,     Hematologic/Lymphatic: no swollen glands    Allergic/Immunologic: no itching      Physical Exam: Vital Signs Last 24 Hrs  T(C): 36.4 (2021 15:34), Max: 36.8 (2021 08:07)  T(F): 97.5 (2021 15:34), Max: 98.2 (2021 08:07)  HR: 78 (2021 15:34) (69 - 78)  BP: 111/52 (2021 15:34) (111/52 - 135/67)  BP(mean): --  RR: 17 (2021 15:34) (17 - 18)  SpO2: 96% (2021 15:34) (96% - 98%)        HEENT: PRRL EOMI    MOUTH/TEETH/GUMS: Clear    NECK: no JVD    LUNGS: Clear    HEART: S1,S2 RR    ABDOMEN: soft nontender    EXTREMITIES:  no pedal edema    MUSCULOSKELETAL: L knee swelling, mild effusion palpable pain with mobilization, bilateral wrist pain with mobilization    NEURO: no tremor, no focal signs.    SKIN: no rash    : CVA negative,     Lab:                       13.3   6.84  )-----------( 181      ( 2021 08:41 )             40.8       02-07    138  |  107  |  11  ----------------------------<  91  4.2   |  24  |  1.02    Ca    9.3      2021 08:41     UA nitrate pos , bact  tntc, wbc 6-10     TPro  7.0  /  Alb  3.4  /  TBili  0.7  /  DBili  x   /  AST  26  /  ALT  30  /  AlkPhos  91  02-07   EKG NSR , RBBB,       HPI: The patient is an :  66 yo male with Hx. of DVT and PE on Xarelto, HTN, HLD, Hypothyroid, ETOH abuse ( sober since ), fell yesterday outside , landed backward and hit head, L knee and L wrist. No LOC, c/o pain today ,not able to ambulate.      PMHx:DVT and PE on Xarelto, HTN, HLD, Hypothyroid, ETOH abuse ,The patient is sober since  when he had a withdrawal seizure,     PSHx: G tube, Gastric bypass  and , Tracheostomy , , cass TKR, Cholecystectomy    Family Hx: Father  from prostate CA, mother diet from stomach CA and had CAD/CABG    Social Hx.: not smoking, no alcohol use    ROS: as in HPI  Eyes: no changes in vision    ENT/Mouth: no changes    Cardiovascular: no chest pain    Respiratory: no SOB    Gastrointestinal: no diarrhea, no nausea, no vomiting    Genitourinary: no dysuria    Breast: no pain    Musculoskeletal: pain in both  wrists , pain in L knee,     Integumentary: no itching    Neurological: No Headache, no tremor,    Psychiatric: no suicidal ideations    Endocrine: no excessive thirst,     Hematologic/Lymphatic: no swollen glands    Allergic/Immunologic: no itching      Physical Exam: Vital Signs Last 24 Hrs  T(C): 36.4 (2021 15:34), Max: 36.8 (2021 08:07)  T(F): 97.5 (2021 15:34), Max: 98.2 (2021 08:07)  HR: 78 (2021 15:34) (69 - 78)  BP: 111/52 (2021 15:34) (111/52 - 135/67)  BP(mean): --  RR: 17 (2021 15:34) (17 - 18)  SpO2: 96% (2021 15:34) (96% - 98%)        HEENT: PRRL EOMI    MOUTH/TEETH/GUMS: Clear    NECK: no JVD    LUNGS: Clear    HEART: S1,S2 RR    ABDOMEN: soft nontender    EXTREMITIES:  no pedal edema    MUSCULOSKELETAL: L knee swelling, mild effusion palpable pain with mobilization, bilateral wrist pain with mobilization    NEURO: no tremor, no focal signs.    SKIN: no rash    : CVA negative,     Lab:                       13.3   6.84  )-----------( 181      ( 2021 08:41 )             40.8       02-07    138  |  107  |  11  ----------------------------<  91  4.2   |  24  |  1.02    Ca    9.3      2021 08:41     UA nitrate pos , bact  tntc, wbc 6-10     TPro  7.0  /  Alb  3.4  /  TBili  0.7  /  DBili  x   /  AST  26  /  ALT  30  /  AlkPhos  91  02-07   EKG NSR , RBBB,    CTH neg,  XRay      XRay wrists neg, X ray Knees neg.      HPI: The patient is a  64 yo male with Hx. of chronic  DVT and PE on Xarelto, HTN, HLD, Hypothyroid, ETOH abuse ( sober since ), fell yesterday outside , landed backward and hit head, L knee and L wrist. No LOC, c/o pain in R wrist , L knee, the pain is exacerbated by weight bearing and he can not ambulate. He received IV Morphine  at 9 am which helped relive the pain for 4-5 hours. Had PT eval in the ER , he could not ambulate and he can not use his R hand because of wrist pain.       PMHx: chronic RLE DVT and PE on Xarelto, HTN, HLD, Hypothyroid, ETOH abuse ,The patient is sober since  when he had a withdrawal seizure,     PSHx: G tube, Gastric bypass  and , Tracheostomy   he required mechanical ventilation for  alcohol withdrawal seizure , cass THR, Cholecystectomy    Family Hx: Father  from prostate CA, mother diet from stomach CA and had CAD/CABG    Social Hx.: not smoking, no alcohol use    ROS: as in HPI  Eyes: no changes in vision    ENT/Mouth: no changes    Cardiovascular: no chest pain    Respiratory: no SOB    Gastrointestinal: no diarrhea, no nausea, no vomiting    Genitourinary: no dysuria    Breast: no pain    Musculoskeletal: pain in both  wrists , pain in L knee,     Integumentary: no itching    Neurological: No Headache, no tremor,    Psychiatric: no suicidal ideations    Endocrine: no excessive thirst,     Hematologic/Lymphatic: no swollen glands    Allergic/Immunologic: no itching      Physical Exam: Vital Signs Last 24 Hrs  T(C): 36.4 (2021 15:34), Max: 36.8 (2021 08:07)  T(F): 97.5 (2021 15:34), Max: 98.2 (2021 08:07)  HR: 78 (2021 15:34) (69 - 78)  BP: 111/52 (2021 15:34) (111/52 - 135/67)  BP(mean): --  RR: 17 (2021 15:34) (17 - 18)  SpO2: 96% (2021 15:34) (96% - 98%)        HEENT: PRRL EOMI    MOUTH/TEETH/GUMS: Clear    NECK: no JVD    LUNGS: Clear    HEART: S1,S2 RR    ABDOMEN: soft nontender    EXTREMITIES:  no pedal edema    MUSCULOSKELETAL: L knee swelling, mild effusion palpable pain with mobilization, bilateral wrist pain with mobilization    NEURO: no tremor, no focal signs.    SKIN: no rash    : CVA negative,     Lab:                       13.3   6.84  )-----------( 181      ( 2021 08:41 )             40.8       02-07    138  |  107  |  11  ----------------------------<  91  4.2   |  24  |  1.02    Ca    9.3      2021 08:41     UA nitrate pos , bact  tntc, wbc 6-10     TPro  7.0  /  Alb  3.4  /  TBili  0.7  /  DBili  x   /  AST  26  /  ALT  30  /  AlkPhos  91  02-07   EKG NSR , RBBB,    CTH neg,  XRay      XRay wrists neg, X ray Knees neg.     Doppler bilat LE:  Nonocclusive thrombus is seen in the RIGHT popliteal vein and posterior tibial and peroneal trunk.

## 2021-02-07 NOTE — ED ADULT NURSE NOTE - NSIMPLEMENTINTERV_GEN_ALL_ED
Implemented All Fall with Harm Risk Interventions:  [place] to call system. Call bell, personal items and telephone within reach. Instruct patient to call for assistance. Room bathroom lighting operational. Non-slip footwear when patient is off stretcher. Physically safe environment: no spills, clutter or unnecessary equipment. Stretcher in lowest position, wheels locked, appropriate side rails in place. Provide visual cue, wrist band, yellow gown, etc. Monitor gait and stability. Monitor for mental status changes and reorient to person, place, and time. Review medications for side effects contributing to fall risk. Reinforce activity limits and safety measures with patient and family. Provide visual clues: red socks.

## 2021-02-07 NOTE — ED PROVIDER NOTE - MUSCULOSKELETAL, MLM
no TTP spine, +TTP distal forearm/R wrist, skin intact, distally NVI. +mild TTP L wrist mild, +tenderness and swelling L knee with pain on passive ROM, 2+ pedal pulse b/l, distally NVI

## 2021-02-07 NOTE — H&P ADULT - ASSESSMENT
66 yo male with Hx. of DVT and PE on Xarelto, HTN, HLD, Hypothyroid, ETOH abuse ( sober since 2016), fell yesterday outside , landed backward and hit head, L knee and L wrist. No LOC, c/o pain today ,not able to ambulate because of L knee pain, has no pain at rest.  assessment Dx:                       1. Contusion L knee                       2. Contusion bilateral wrists                       3. Gait impairment                        4. DVT Hx. on  Xarelto    Plan:    admit to medicine               medications: as per med rec.                VTEP:  on Xarelto               Labs: cbc,bmp               Radiology: XRay Knee, wrists               Cardiac diagnostics: EKG               Consults: Orthopedic, hand surgery, PT.                                   66 yo male with Hx. of DVT and PE on Xarelto, HTN, HLD, Hypothyroid, ETOH abuse ( sober since 2016), fell yesterday outside , landed backward and hit head, L knee and L wrist. No LOC, c/o pain today ,not able to ambulate because of L knee pain, has no pain at rest.  assessment Dx:                       1. Contusion L knee                       2. Contusion bilateral wrists                       3. Gait impairment                        4. DVT Hx. on  Xarelto                       5. UTI.     Plan:    admit to medicine               medications: as per med rec.  Rx ceftriaxone for UTI x 3 days               VTEP:  on Xarelto               Labs: cbc,bmp               Radiology: XRay Knee, wrists               Cardiac diagnostics: EKG               Consults: Orthopedic, hand surgery, PT.                                   66 yo male with Hx. of DVT and PE on Xarelto, HTN, HLD, Hypothyroid, ETOH abuse ( sober since 2016), fell yesterday outside , landed backward and hit head, L knee and L wrist. No LOC, c/o pain today ,not able to ambulate because of L knee pain, has no pain at rest.  assessment Dx:                       1. Contusion L knee                       2. Contusion bilateral wrists                       3. Gait impairment                        4. DVT Hx. on  Xarelto                       5. UTI.     Plan:    admit to medicine               medications: as per med rec.  Rx Cipro 500 mg bid x 7 d               VTEP:  on Xarelto               Labs: cbc,bmp               Radiology: XRay Knee, wrists               Cardiac diagnostics: EKG               Consults: Orthopedic, hand surgery, PT.                                   64 yo male with Hx. of DVT and PE on Xarelto, HTN, HLD, Hypothyroid, ETOH abuse ( sober since 2016), fell yesterday outside , landed backward and hit head, L knee and L wrist. No LOC, c/o pain today ,not able to ambulate because of L knee pain, has no pain at rest.  assessment Dx:                       1. Contusion L knee                       2. Contusion bilateral wrists                       3. Gait impairment                        4. DVT Hx. on  Xarelto                       5. UTI.     Plan:    admit to medicine               medications: as per med rec.  Rx Cipro 500 mg bid x 7 d               VTEP:  on Xarelto               Labs: cbc,bmp               Radiology: XRay Knee, wrists, venous doppler lower extremities.               Cardiac diagnostics: EKG               Consults: Orthopedic, hand surgery, PT.                                   66 yo male with Hx. of DVT and PE on Xarelto, HTN, HLD, Hypothyroid, ETOH abuse ( sober since 2016), fell yesterday outside , landed backward and hit head, L knee and L wrist. No LOC, c/o pain in R wrist , L knee, the pain is exacerbated by weight bearing and he can not ambulate. He received IV Morphine  at 9 am which helped relive the pain for 4-5 hours. Had PT eval in the ER , he could not ambulate and he can not use his R hand because of wrist pain.   assessment Dx:                       1. Contusion L knee                       2. Contusion bilateral wrists                       3. Gait impairment                        4.Chronic  DVT Hx. on  Xarelto                       5. UTI.     Plan:    admit to medicine               medications: as per med rec.  Rx Cipro 500 mg bid x 7 d               VTEP:  on Xarelto               Labs: cbc,bmp               Radiology: XRay Knee, wrists, venous doppler lower extremities.               Cardiac diagnostics: EKG               Consults: Orthopedic, hand surgery, PT.

## 2021-02-07 NOTE — ED ADULT TRIAGE NOTE - CHIEF COMPLAINT QUOTE
Pt. to the ED C/O Head, Right wrist and left Pain, Back Pain and Left Knee Pain S/P Fall yesterday at 6pm- Pt. states he tripped over Groceries- Denies LOC- + Cardiac Hx. on Xarelto- TA to ED Called by EMS RN @ 805am -- [facility]

## 2021-02-07 NOTE — ED PROVIDER NOTE - CARE PLAN
Principal Discharge DX:	Knee injury, left, initial encounter  Secondary Diagnosis:	Wrist sprain, left, initial encounter  Secondary Diagnosis:	Fall, initial encounter

## 2021-02-07 NOTE — PHYSICAL THERAPY INITIAL EVALUATION ADULT - MANUAL MUSCLE TESTING RESULTS, REHAB EVAL
grossly WFL, unable to formally test left knee ext but anti-gravity strength demonstrated./no strength deficits were identified

## 2021-02-07 NOTE — ED ADULT NURSE REASSESSMENT NOTE - NS ED NURSE REASSESS COMMENT FT1
Pt. is resting in bed- Pt. eating Lunch Comfortably- Pending admission to floor- Will cont to monitor patient closely- Safety maintained
Pt. is resting in bed- Pt. not able to fully ambulate without assist- PT and SW to the bedside- Pt. pending possible admission- Will cont to monitor patient closely- Safety maintained
Assumed care of patient from Lennie Sage RN at 18:57. Patient A&Ox4. Patient noted very tense and verbally aggressive towards staff, patient verbalizing feelings of anger and disgust with current hospitalization, patient noted very labile towards staff. De-escalation techniques utilized, attentive listening methods utilized with patient's concerns, patient c/o pain, acetaminophen offered per orders, refer to emar for details, patient refused acetaminophen PO, patient requested IV Morphine, patient educated on lack of medical order for this medication, patient became labile with staff, Hospitalist contacted various times by this RN, Garrett JORDAN made aware and is noted participating in de-escalation techniques at patient's bedside, pain medication orders obtained, patient medicated per orders, refer to e-mar for details, patient updated on transfer to inpatient unit, patient educated on plan of care, patient agreeable with plan of care at this time, no overt changes to report at this time.

## 2021-02-07 NOTE — ED PROVIDER NOTE - OBJECTIVE STATEMENT
66 y/o M with a PMHx of DVT on Xarelto, HTN, HLD, presents to the ED s/p fall yesterday. Pt was in the back yard and while picking up groceries he slipped and fell onto back and hurting L knee and L wrist, landed backwards and hit his head. States L knee is hurting and has difficulty ambulating due to pain, has R wrist pain is not able to use hand and also with mild L wrist pain. Denies LOC, headache, or chest pain. 64 y/o M with a PMHx of DVT on Xarelto, HTN, HLD, presents to the ED s/p fall yesterday. Pt was in the back yard and while picking up groceries he slipped and fell onto back and hurting L knee and L wrist, landed backwards and hit his head. States L knee is hurting and has difficulty ambulating due to pain, has R wrist pain with difficulty moving R hand and also with mild L wrist pain. Denies LOC, headache, or chest pain.

## 2021-02-07 NOTE — PHYSICAL THERAPY INITIAL EVALUATION ADULT - PERTINENT HX OF CURRENT PROBLEM, REHAB EVAL
66 y/o M with a PMHx of DVT on Xarelto, HTN, HLD, presents to the ED s/p fall yesterday. Pt was in the back yard and while picking up groceries he slipped and fell onto back and hurting L knee and L wrist, landed backwards and hit his head. States L knee is hurting and has difficulty ambulating due to pain, has R wrist pain with difficulty moving R hand and also with mild L wrist pain.

## 2021-02-07 NOTE — ED PROVIDER NOTE - ENMT, MLM
Airway patent, no TTP to skull, Mouth with normal mucosa. Throat has no vesicles, no oropharyngeal exudates and uvula is midline.

## 2021-02-07 NOTE — ED PROVIDER NOTE - PROGRESS NOTE DETAILS
Michel WHEATLEY for Dr. Ching: updated pt on results of CT, states currently having outpatient w/u for CT findings, currently not on abx, discussed abx and agrees to start Macrobid Michel WHEATLEY for Dr. Ching: PT evaluated pt, pt unable to ambulate will discuss with social work but will mostly likely hospitalize Michel Ching: discussed with Dr. Valero for admission Attending Ching, Pt updated on plan and given cannot walk, no safe to rayray Pearson EA for Dr. Ching: discussed with Dr. Valero for admission

## 2021-02-07 NOTE — PHYSICAL THERAPY INITIAL EVALUATION ADULT - STANDING BALANCE: DYNAMIC, REHAB EVAL
MD Notification    Notified Person: MD    Notified Person Name: earnestine    Notification Date/Time: 5/28 1100    Notification Interaction: text page    Purpose of Notification: pt requesting to discharge at noon for court hearing. Please advise    Orders Received:    Comments:       fair balance

## 2021-02-07 NOTE — ED ADULT NURSE NOTE - OBJECTIVE STATEMENT
c/o unwitnessed fall at 630PM last night while reaching for groceries, denies head injury or LOC. c/o b/l wrist pain and L knee pain. See trauma flow sheet

## 2021-02-07 NOTE — ED PROVIDER NOTE - CONSTITUTIONAL, MLM
normal... Obese body habitus, awake, alert, oriented to person, place, time/situation and in no apparent distress.

## 2021-02-08 LAB
HCV AB S/CO SERPL IA: 0.08 S/CO — SIGNIFICANT CHANGE UP (ref 0–0.99)
HCV AB SERPL-IMP: SIGNIFICANT CHANGE UP
SARS-COV-2 IGG SERPL QL IA: POSITIVE
SARS-COV-2 IGM SERPL IA-ACNC: 30.6 INDEX — HIGH

## 2021-02-08 PROCEDURE — 99221 1ST HOSP IP/OBS SF/LOW 40: CPT

## 2021-02-08 PROCEDURE — 99223 1ST HOSP IP/OBS HIGH 75: CPT

## 2021-02-08 PROCEDURE — 99232 SBSQ HOSP IP/OBS MODERATE 35: CPT

## 2021-02-08 RX ORDER — INFLUENZA VIRUS VACCINE 15; 15; 15; 15 UG/.5ML; UG/.5ML; UG/.5ML; UG/.5ML
0.5 SUSPENSION INTRAMUSCULAR ONCE
Refills: 0 | Status: COMPLETED | OUTPATIENT
Start: 2021-02-08 | End: 2021-02-09

## 2021-02-08 RX ADMIN — MORPHINE SULFATE 4 MILLIGRAM(S): 50 CAPSULE, EXTENDED RELEASE ORAL at 05:37

## 2021-02-08 RX ADMIN — MORPHINE SULFATE 4 MILLIGRAM(S): 50 CAPSULE, EXTENDED RELEASE ORAL at 23:14

## 2021-02-08 RX ADMIN — PANTOPRAZOLE SODIUM 40 MILLIGRAM(S): 20 TABLET, DELAYED RELEASE ORAL at 10:01

## 2021-02-08 RX ADMIN — AMLODIPINE BESYLATE 10 MILLIGRAM(S): 2.5 TABLET ORAL at 10:01

## 2021-02-08 RX ADMIN — MORPHINE SULFATE 4 MILLIGRAM(S): 50 CAPSULE, EXTENDED RELEASE ORAL at 11:29

## 2021-02-08 RX ADMIN — RIVAROXABAN 20 MILLIGRAM(S): KIT at 17:02

## 2021-02-08 RX ADMIN — Medication 500 MILLIGRAM(S): at 05:37

## 2021-02-08 RX ADMIN — FAMOTIDINE 20 MILLIGRAM(S): 10 INJECTION INTRAVENOUS at 10:01

## 2021-02-08 RX ADMIN — ONDANSETRON 4 MILLIGRAM(S): 8 TABLET, FILM COATED ORAL at 23:16

## 2021-02-08 RX ADMIN — MORPHINE SULFATE 4 MILLIGRAM(S): 50 CAPSULE, EXTENDED RELEASE ORAL at 15:42

## 2021-02-08 RX ADMIN — Medication 1 TABLET(S): at 10:01

## 2021-02-08 RX ADMIN — ONDANSETRON 4 MILLIGRAM(S): 8 TABLET, FILM COATED ORAL at 17:01

## 2021-02-08 RX ADMIN — MORPHINE SULFATE 4 MILLIGRAM(S): 50 CAPSULE, EXTENDED RELEASE ORAL at 01:08

## 2021-02-08 RX ADMIN — ONDANSETRON 4 MILLIGRAM(S): 8 TABLET, FILM COATED ORAL at 10:10

## 2021-02-08 RX ADMIN — Medication 75 MICROGRAM(S): at 05:37

## 2021-02-08 NOTE — CONSULT NOTE ADULT - SUBJECTIVE AND OBJECTIVE BOX
Orthopedics Consult Note:    This is a 65y Male with PMHx chronic RLE DVT and PE on Xarelto, HTN, HLD, Hypothyroid, and ETOH abuse (sober since 2016) admitted to medical service s/p slip and fall on his porch on ice last night. Pt reports he was coming in with groceries when he stepped off the mat and his leg slipped out ultimately causing him to fall backwards onto his but and bilateral wrists. Pt c/o left knee pain and bilateral wrist pain R>L. Pt reports he hit his head but denies LOC. Pt had difficulty ambulating afterwards due to knee pain and unable to support himself with his arms due to wrist pain. Left knee and bilateral wrist pains are all largely improved today per patient. Pt denies any numbness, tingling or paresthesias. Pt ambulates without any assistive devices at baseline.    Past Medical and Surgical History:  Unspecified injury of left lower leg, initial encounter    No pertinent family history in first degree relatives    Handoff    MEWS Score    Other hyperlipidemia    Pulmonary emboli    Acquired hypothyroidism    Hypertension    Alcohol abuse    DVT (deep venous thrombosis), right    Hypertension    Knee injury, left, initial encounter    S/P cholecystectomy    History of tracheostomy    Feeding by G-tube    S/P hip replacement    History of gastric surgery    H/O gastric bypass    FALL    90+    Fall, initial encounter    Wrist sprain, left, initial encounter    [email or web address]        Allergies:  penicillin (Fever)  penicillins (Unknown)      Labs:                        13.3   6.84  )-----------( 181      ( 07 Feb 2021 08:41 )             40.8   02-07    138  |  107  |  11  ----------------------------<  91  4.2   |  24  |  1.02    Ca    9.3      07 Feb 2021 08:41    TPro  7.0  /  Alb  3.4  /  TBili  0.7  /  DBili  x   /  AST  26  /  ALT  30  /  AlkPhos  91  02-07    PT/INR - ( 07 Feb 2021 10:22 )   PT: 18.2 sec;   INR: 1.60 ratio         PTT - ( 07 Feb 2021 10:22 )  PTT:34.8 sec        Imaging:  X-rays of the left knee demonstrate no evidence of fracture, dislocation or acute bony injury.  X-rays of bilateral wrist demonstrate no evidence of fracture, dislocation or acute bony injury. Calcific changes noted between base of 1st MCP to radial styloid.    PE left knee:  +mild-moderate posterior knee swelling medial>lateral, +small ecchymosis posterior knee, no erythema, skin intact, normothermic. +large TTP over medial hamstrings at the myotendous junction and insertions of semitendinosus and semimembanosis. +mild TTP over medial and lateral joint lines. Good ROM, pain with resisted flexion. Pt able to SLR. No pain with axial loading, no pain with log roll. Moving ankle and all toes, +EHL/FHL/TA/GS. SILT throughout. DP and PT pulses 2+.    PE right wrist:  +mild swelling, no ecchymosis, no erythema, skin intact, normothermic. +TTP over DRUJ and tip of radial styloid/first extensor compartment. Pain at limits of wrist extension and supination/pronation at DRUJ. Moving wrist and all fingers, +AIN/PIN/Radial nerve/Ulnar nerve/Median nerve/Musculocutaneous nerve. SILT throughout. Radial pulse 2+.    PE left wrist:  No swelling, no ecchymosis, no erythema, skin intact, normothermic. No TTP. Good painless ROM. Moving wrist and all fingers, +AIN/PIN/Radial nerve/Ulnar nerve/Median nerve/Musculocutaneous nerve. SILT throughout. Radial pulse 2+.    Secondary Survey:  Left hip, left ankle, RLEs, bilateral hips, bilateral hips, and bilateral ankles with no swelling, no ecchymoses, no abrasions, no lacerations or any other signs of injury with full painless baseline ROM and no bony TTP. Able to SLR B/L LEs. No pain with axial loading or log roll B/L LEs. Sensation intact distally, moving all digits. Distal pulses intact.     Spine and ribs with no bony TTP.     Assessment:  65y Male with a left hamstrings strain and right wrist DRUJ sprain and EPL strain/tendonitis s/p slip and fall yesterday.    Plan:  No acute Orthopedic surgical intervention indicated at this time.  Pain control; NSAIDs unless contraindicated.  Ice application to left knee and right wrist.  RUE elevation.  Ortho stable for discharge.  Follow-up with Dr. Quintero within 2-4 weeks of discharge as needed.    Case discussed with Dr. Quintero who agrees with plan.
HPI  HPI:  HPI: The patient is a  64 yo male with Hx. of chronic  DVT and PE on Xarelto, HTN, HLD, Hypothyroid, ETOH abuse ( sober since 2016), fell yesterday outside , landed backward and hit head, L knee and L wrist. No LOC, c/o pain in R wrist , L knee, the pain is exacerbated by weight bearing and he can not ambulate. He received IV Morphine  at 9 am which helped relive the pain for 4-5 hours. Had PT eval in the ER , he could not ambulate and he can not use his R hand because of wrist pain.     Consulted by Dr. Ramirez /Bridget NP for VTE prophylaxis, risk stratification, and anticoagulation management.    PAST MEDICAL & SURGICAL HISTORY:  Other hyperlipidemia    Pulmonary emboli    Acquired hypothyroidism    Hypertension    Alcohol abuse  sober since 4/13/2015    DVT (deep venous thrombosis), right  ankle    S/P cholecystectomy    History of tracheostomy  placed and reversed summer 2015    Feeding by G-tube  placed and reversed summer 2015    S/P hip replacement  b/l    History of gastric surgery  gastric sleeve 1990s    H/O gastric bypass  1990s and 2011    Interval History:  2/8/21: Patient seen at bedside. Denying any pain in RLE at this time. He is aware of chronic clot in right leg. Started with an ankle injury while in service with a + DVT down in vein by ankle in 1990's. At that time he was on coumadin. Once Pradaxa released, was then transitioned and ultimately ended up on Xarelto 20 mg daily. He denies any missed doses. He did see a vascular surgeon about a year ago at [facility] who told him him clot extended to his knee and "Something about my vein above that, I don't remember." After consulting his chart in [facility] [employer] I did come across note from Dr. Khan- vascular with doppler report as below citing the chronic right LE DVT and extension of valvular disease and reflux up to femoral vein. He did recommend compression stockings at that time, patient does not wear.   BMI: 33.9    CrCl: 113.5    IMPROVE VTE Risk Score:4    IMPROVE Bleeding Risk Score:2.5      Falls Risk:   High (  )  Mod ( x )  Low (  )      FAMILY HISTORY:  No pertinent family history in first degree relatives      Denies any personal or familial history of clotting or bleeding disorders.    Allergies    penicillin (Fever)  penicillins (Unknown)    Intolerances        REVIEW OF SYSTEMS    (  )Fever	        (  )Constipation	(  )SOB				  (  )Headache   (  )Dysuria  (  )Chills	        (  )Melena	        (  )Dyspnea on exertion (  )Dizziness    (  )Polyuria  (  )Nausea      (  )Hematochezia	(  )Cough                          (  )Syncope      (  )Hematuria  (  )Vomiting   (  )Chest Pain	        (  )Wheezing			  (  )Weakness  (  )Diarrhea    (  )Palpitations	(  )Anorexia			  (  )Myalgia       (   ) Arthralgia    Pertinent positives in HPI and daily subjective. All other systems negative.    Vital Signs Last 24 Hrs  T(C): 36.7 (08 Feb 2021 09:00), Max: 36.8 (07 Feb 2021 19:48)  T(F): 98.1 (08 Feb 2021 09:00), Max: 98.2 (07 Feb 2021 19:48)  HR: 77 (08 Feb 2021 09:00) (75 - 79)  BP: 112/52 (08 Feb 2021 09:00) (111/52 - 136/74)  BP(mean): 88 (07 Feb 2021 19:48) (88 - 88)  RR: 16 (08 Feb 2021 09:00) (16 - 20)  SpO2: 98% (08 Feb 2021 09:00) (96% - 98%)      2/7/2021:  IMPRESSION:  Nonocclusive thrombus is seen in the RIGHT popliteal vein and posterior tibial and peroneal trunk.    2/3/2020  Procedure  RLE venous duplex shows chronic DVT with recanalization noted in the popliteal vein. Reflux noted in the femoral vein to popliteal vein. There is also GSV reflux as well as VV noted in the thigh and calf.     IMPRESSION: Head CT  Mild chronic microvascular changes without evidence of an acute transcortical infarction or hemorrhage.    PHYSICAL EXAM:    Constitutional: Appears Well    Neurological: A& O x 3    Skin: Warm    Respiratory and Thorax: normal effort; Breath sounds: normal; No rales/wheezing/rhonchi  	  Cardiovascular: S1, S2, regular, NMBR	    Gastrointestinal: BS + x 4Q, nontender	    Musculoskeletal:   General Right:   no muscle/joint tenderness,   normal tone, no joint swelling,   ROM: full	    General Left:   no muscle/joint tenderness,   normal tone, no joint swelling,   ROM: full    Lower extrems:   Right: no calf tenderness              negative madhavi's sign               + pedal pulses    Left:   no calf tenderness              negative madhavi's sign               + pedal pulses                          13.3   6.84  )-----------( 181      ( 07 Feb 2021 08:41 )             40.8       02-07    138  |  107  |  11  ----------------------------<  91  4.2   |  24  |  1.02    Ca    9.3      07 Feb 2021 08:41    TPro  7.0  /  Alb  3.4  /  TBili  0.7  /  DBili  x   /  AST  26  /  ALT  30  /  AlkPhos  91  02-07      PT/INR - ( 07 Feb 2021 10:22 )   PT: 18.2 sec;   INR: 1.60 ratio         PTT - ( 07 Feb 2021 10:22 )  PTT:34.8 sec				    MEDICATIONS  (STANDING):  amLODIPine   Tablet 10 milliGRAM(s) Oral daily  calcium carbonate 1250 mG  + Vitamin D (OsCal 500 + D) 1 Tablet(s) Oral daily  ciprofloxacin     Tablet 500 milliGRAM(s) Oral every 12 hours  famotidine    Tablet 20 milliGRAM(s) Oral daily  influenza   Vaccine 0.5 milliLiter(s) IntraMuscular once  levothyroxine 75 MICROGram(s) Oral daily  pantoprazole    Tablet 40 milliGRAM(s) Oral daily  rivaroxaban 20 milliGRAM(s) Oral with dinner      **Current DVT Prophylaxis:    LMWH                   (  )  Heparin SQ           (  )  Coumadin             (  )  Xarelto                  (x  )  Eliquis                   (  )  Venodynes           (  )  Ambulation          ( x )  UFH                       (  )  ECASA                   (  )  Contraindicated  (  )

## 2021-02-08 NOTE — CONSULT NOTE ADULT - ASSESSMENT
This is a 65 year old male present now s/p fall not sustaining any serious acute injury with history of chronic RLE DVT on Xarelto. Positive head strike with negative CT head. Doppler does show + RLE DVT, however does not indicate chronicity. Unable to reach Radiology at this time but will continue to attempt to determine chronicity. I have further researched and found doppler report as well as vascular consult from one year ago, Both seem to correlate with the history obtained from patient, so I am inclined to think that this DVT is, in fact, chronic. I would recommend Xarelto 20 mg PO daily as per usual, with meal.    Plan:  ::Continue Xarelto 20mg PO daily  ::Daily CBC/BMP  ::Enc ambulation  ::Recommend compression stocking upon discharge    Thank you for this consult, will continue to follow.

## 2021-02-08 NOTE — CONSULT NOTE ADULT - REASON FOR ADMISSION
Contusion L knee , contusion wrists , gait impariment
Contusion L knee , contusion wrists , gait impariment

## 2021-02-08 NOTE — PROGRESS NOTE ADULT - SUBJECTIVE AND OBJECTIVE BOX
PCP    Patient is a 65y old  Male who presents with a chief complaint of Contusion L knee , contusion wrists , gait impariment (08 Feb 2021 13:19)        HPI:  HPI: The patient is a  64 yo male with Hx. of chronic  DVT and PE on Xarelto, HTN, HLD, Hypothyroid, ETOH abuse ( sober since 2016), fell yesterday outside , landed backward and hit head, L knee and L wrist. No LOC, c/o pain in R wrist , L knee, the pain is exacerbated by weight bearing and he can not ambulate.       2/8: seen at bedside, and amb with PT, c/o left knee and right wrist pain      ROS: all negative otherwise pertinent  positives documented in HPI        Vital Signs Last 24 Hrs  T(C): 36.7 (02-08-21 @ 09:00), Max: 36.8 (02-07-21 @ 19:48)  T(F): 98.1 (02-08-21 @ 09:00), Max: 98.2 (02-07-21 @ 19:48)  HR: 81 (02-08-21 @ 11:25) (75 - 81)  BP: 138/62 (02-08-21 @ 11:25) (111/52 - 138/62)  BP(mean): 88 (02-07-21 @ 19:48) (88 - 88)  RR: 16 (02-08-21 @ 11:25) (16 - 20)  SpO2: 98% (02-08-21 @ 11:25) (96% - 98%)        MEDICATIONS  (STANDING):  amLODIPine   Tablet 10 milliGRAM(s) Oral daily  calcium carbonate 1250 mG  + Vitamin D (OsCal 500 + D) 1 Tablet(s) Oral daily  ciprofloxacin     Tablet 500 milliGRAM(s) Oral every 12 hours  famotidine    Tablet 20 milliGRAM(s) Oral daily  influenza   Vaccine 0.5 milliLiter(s) IntraMuscular once  levothyroxine 75 MICROGram(s) Oral daily  pantoprazole    Tablet 40 milliGRAM(s) Oral daily  rivaroxaban 20 milliGRAM(s) Oral with dinner    MEDICATIONS  (PRN):  acetaminophen   Tablet .. 650 milliGRAM(s) Oral every 6 hours PRN Mild Pain (1 - 3)  morphine  - Injectable 4 milliGRAM(s) IV Push every 4 hours PRN Severe Pain (7 - 10)  ondansetron Injectable 4 milliGRAM(s) IV Push every 6 hours PRN Nausea and/or Vomiting            PHYSICAL EXAM:    GENERAL: Comfortable, no acute distress   HEAD:  Normocephalic, atraumatic  EYES: EOMI, PERRLA  HEENT: Moist mucous membranes  NECK: Supple, No JVD  NERVOUS SYSTEM:  Alert & Oriented X3, Motor Strength 5/5 B/L upper and lower extremities  CHEST/LUNG: Clear to auscultation bilaterally  HEART: Regular rate and rhythm  ABDOMEN: Soft, non tender, Nondistended, Bowel sounds present  GENITOURINARY: Voiding, no palpable bladder  EXTREMITIES:   No clubbing, cyanosis, or edema  MUSCULOSKELETAL- + LLE knee pain, and + right wrist pain  SKIN-no rash          radiology tests:      CTH neg,  XRay      XRay wrists neg, X ray Knees neg.     Doppler bilat LE:  Nonocclusive thrombus is seen in the RIGHT popliteal vein and posterior tibial and peroneal trunk. (07 Feb 2021 17:00)      Review of system- Rest of the review of system are normal except mentioned in HPI      Labs:                          13.3   6.84  )-----------( 181      ( 07 Feb 2021 08:41 )             40.8       02-07    138  |  107  |  11  ----------------------------<  91  4.2   |  24  |  1.02    Ca    9.3      07 Feb 2021 08:41    TPro  7.0  /  Alb  3.4  /  TBili  0.7  /  DBili  x   /  AST  26  /  ALT  30  /  AlkPhos  91  02-07      PT/INR - ( 07 Feb 2021 10:22 )   PT: 18.2 sec;   INR: 1.60 ratio         PTT - ( 07 Feb 2021 10:22 )  PTT:34.8 sec      assessment:  64 yo male with above pmh a/w:      # fall, left knee pain, Right wrist pain   PT eval  splint right wrist  Pain control    # DVT  acute vs chronic: not clear on radiology report. No doppler reports in chart review to compare  cont Xarelto  AC consult: appreciated      # hypothyroidism  cont levothyroid    # UTI   no pyuria  d/c cipro  monitor    # HTN  cont amlodipine    #HLD  not on statin    Dispo  home tomorrow         PCP    Patient is a 65y old  Male who presents with a chief complaint of Contusion L knee , contusion wrists , gait impariment (08 Feb 2021 13:19)        HPI:  HPI: The patient is a  64 yo male with Hx. of chronic  DVT and PE on Xarelto, HTN, HLD, Hypothyroid, ETOH abuse ( sober since 2016), fell yesterday outside , landed backward and hit head, L knee and L wrist. No LOC, c/o pain in R wrist , L knee, the pain is exacerbated by weight bearing and he can not ambulate.       2/8: seen at bedside, and amb with PT, c/o left knee and right wrist pain      ROS: all negative otherwise pertinent  positives documented in HPI        Vital Signs Last 24 Hrs  T(C): 36.7 (02-08-21 @ 09:00), Max: 36.8 (02-07-21 @ 19:48)  T(F): 98.1 (02-08-21 @ 09:00), Max: 98.2 (02-07-21 @ 19:48)  HR: 81 (02-08-21 @ 11:25) (75 - 81)  BP: 138/62 (02-08-21 @ 11:25) (111/52 - 138/62)  BP(mean): 88 (02-07-21 @ 19:48) (88 - 88)  RR: 16 (02-08-21 @ 11:25) (16 - 20)  SpO2: 98% (02-08-21 @ 11:25) (96% - 98%)        MEDICATIONS  (STANDING):  amLODIPine   Tablet 10 milliGRAM(s) Oral daily  calcium carbonate 1250 mG  + Vitamin D (OsCal 500 + D) 1 Tablet(s) Oral daily  ciprofloxacin     Tablet 500 milliGRAM(s) Oral every 12 hours  famotidine    Tablet 20 milliGRAM(s) Oral daily  influenza   Vaccine 0.5 milliLiter(s) IntraMuscular once  levothyroxine 75 MICROGram(s) Oral daily  pantoprazole    Tablet 40 milliGRAM(s) Oral daily  rivaroxaban 20 milliGRAM(s) Oral with dinner    MEDICATIONS  (PRN):  acetaminophen   Tablet .. 650 milliGRAM(s) Oral every 6 hours PRN Mild Pain (1 - 3)  morphine  - Injectable 4 milliGRAM(s) IV Push every 4 hours PRN Severe Pain (7 - 10)  ondansetron Injectable 4 milliGRAM(s) IV Push every 6 hours PRN Nausea and/or Vomiting            PHYSICAL EXAM:    GENERAL: Comfortable, no acute distress   HEAD:  Normocephalic, atraumatic  EYES: EOMI, PERRLA  HEENT: Moist mucous membranes  NECK: Supple, No JVD  NERVOUS SYSTEM:  Alert & Oriented X3, Motor Strength 5/5 B/L upper and lower extremities  CHEST/LUNG: Clear to auscultation bilaterally  HEART: Regular rate and rhythm  ABDOMEN: Soft, non tender, Nondistended, Bowel sounds present  GENITOURINARY: Voiding, no palpable bladder  EXTREMITIES:   No clubbing, cyanosis, or edema  MUSCULOSKELETAL- + LLE knee pain, and + right wrist pain  SKIN-no rash          radiology tests:      CTH neg,  XRay      XRay wrists neg, X ray Knees neg.     Doppler bilat LE:  Nonocclusive thrombus is seen in the RIGHT popliteal vein and posterior tibial and peroneal trunk. (07 Feb 2021 17:00)      Review of system- Rest of the review of system are normal except mentioned in HPI      Labs:                          13.3   6.84  )-----------( 181      ( 07 Feb 2021 08:41 )             40.8       02-07    138  |  107  |  11  ----------------------------<  91  4.2   |  24  |  1.02    Ca    9.3      07 Feb 2021 08:41    TPro  7.0  /  Alb  3.4  /  TBili  0.7  /  DBili  x   /  AST  26  /  ALT  30  /  AlkPhos  91  02-07      PT/INR - ( 07 Feb 2021 10:22 )   PT: 18.2 sec;   INR: 1.60 ratio         PTT - ( 07 Feb 2021 10:22 )  PTT:34.8 sec      assessment:  64 yo male with above pmh a/w:      #fall with LT hamstring strain and RT wrist sprain  Ortho eval appreciated  Ice  PT    # DVT  acute vs chronic: not clear on radiology report. No doppler reports in chart review to compare  cont Xarelto  AC consult: appreciated      # hypothyroidism  cont levothyroid    # Doubt UTI, likely asymptomatic bacteriuria   no pyuria  d/c cipro  monitor    # HTN  cont amlodipine    #HLD  not on statin    Dispo  home tomorrow

## 2021-02-08 NOTE — PROGRESS NOTE ADULT - ASSESSMENT
64 yo male with Hx. of DVT and PE on Xarelto, HTN, HLD, Hypothyroid, ETOH abuse ( sober since 2016), fell yesterday outside , landed backward and hit head, L knee and L wrist. No LOC, c/o pain in R wrist , L knee, the pain is exacerbated by weight bearing and he can not ambulate. He received IV Morphine  at 9 am which helped relive the pain for 4-5 hours. Had PT eval in the ER , he could not ambulate and he can not use his R hand because of wrist pain.   assessment Dx:                       1. Contusion L knee                       2. Contusion bilateral wrists                       3. Gait impairment                        4.Chronic  DVT Hx. on  Xarelto                       5. UTI.     Plan:    admit to medicine               medications: as per med rec.  Rx Cipro 500 mg bid x 7 d               VTEP:  on Xarelto               Labs: cbc,bmp               Radiology: XRay Knee, wrists, venous doppler lower extremities.               Cardiac diagnostics: EKG               Consults: Orthopedic, hand surgery, PT.

## 2021-02-09 ENCOUNTER — TRANSCRIPTION ENCOUNTER (OUTPATIENT)
Age: 66
End: 2021-02-09

## 2021-02-09 VITALS
DIASTOLIC BLOOD PRESSURE: 76 MMHG | OXYGEN SATURATION: 98 % | TEMPERATURE: 98 F | RESPIRATION RATE: 18 BRPM | SYSTOLIC BLOOD PRESSURE: 131 MMHG | HEART RATE: 108 BPM

## 2021-02-09 PROCEDURE — 99231 SBSQ HOSP IP/OBS SF/LOW 25: CPT

## 2021-02-09 PROCEDURE — 99239 HOSP IP/OBS DSCHRG MGMT >30: CPT

## 2021-02-09 RX ADMIN — PANTOPRAZOLE SODIUM 40 MILLIGRAM(S): 20 TABLET, DELAYED RELEASE ORAL at 09:27

## 2021-02-09 RX ADMIN — FAMOTIDINE 20 MILLIGRAM(S): 10 INJECTION INTRAVENOUS at 09:27

## 2021-02-09 RX ADMIN — INFLUENZA VIRUS VACCINE 0.5 MILLILITER(S): 15; 15; 15; 15 SUSPENSION INTRAMUSCULAR at 13:15

## 2021-02-09 RX ADMIN — AMLODIPINE BESYLATE 10 MILLIGRAM(S): 2.5 TABLET ORAL at 09:27

## 2021-02-09 RX ADMIN — Medication 1 TABLET(S): at 09:27

## 2021-02-09 RX ADMIN — Medication 75 MICROGRAM(S): at 05:53

## 2021-02-09 NOTE — DISCHARGE NOTE NURSING/CASE MANAGEMENT/SOCIAL WORK - NSDCVIVACCINE_GEN_ALL_CORE_FT
Influenza , 2017/10/10 12:08 , Bonita Monaco (RN)  Influenza , 2021/2/9 13:15 , Mercedes Hyed (RN)

## 2021-02-09 NOTE — PROGRESS NOTE ADULT - ATTENDING COMMENTS
Patient is seen and examined at bedside with NP Daisy Chavarria. He was up and ambulated with PT, did stairs. Feels better and wants to go home. Agree with above assessment and plan. D/w pt
Patient is seen and examined at bedside with NP Daisy Chavarria. He was up and ambulated with PT, still has LT knee pain but able to bear weight on it. Has RT wrist swelling, but could use platform walker. Seen by ortho, non-surgical. Will need to practice stairs tomorrow with PT prior to going home. Agree with above assessment and plan. D/w pt and ortho team

## 2021-02-09 NOTE — DISCHARGE NOTE PROVIDER - NSDCCPCAREPLAN_GEN_ALL_CORE_FT
PRINCIPAL DISCHARGE DIAGNOSIS  Diagnosis: Knee injury, left, initial encounter  Assessment and Plan of Treatment: contusion  ice or heat to area as candida  tylenol as needed for pain for pain  Do  not take NSAIDS whole on Xarelto  DISCHARGE INSTRUCTIONS:  Seek care immediately if:   •Any part of your leg feels cold, numb, or looks pale.  Call your doctor if:   •You have new or increased swelling, bruising, or pain in your knee.  •Your symptoms do not improve within 6 weeks, even with treatment.  •You have questions or concerns about your condition or care.   Medicines:   •Acetaminophen decreases pain and fever. It is available without a doctor's order. Ask how much to take and how often to take it. Follow directions. Read the labels of all other medicines you are using to see if they also contain acetaminophen, or ask your doctor or pharmacist. Acetaminophen can cause liver damage if not taken correctly. Do not use more than 4 grams (4,000 milligrams) total of acetaminophen in one day.   •Prescription pain medicine may be given. Ask your healthcare provider how to take this medicine safely. Some prescription pain medicines contain acetaminophen. Do not take other medicines that contain acetaminophen without talking to your healthcare provider. Too much acetaminophen may cause liver damage. Prescription pain medicine may cause constipation. Ask your healthcare provider how to prevent or treat constipation.   •Take your medicine as directed. Contact your healthcare provider if you think your medicine is not helping or if you have side effects. Tell him or her if you are allergic to any medicine. Keep a list of the medicines, vitamins, and herbs you take. Include the amounts, and when and why you take them. Bring the list or the pill bottles to follow-up visits. Carry your medicine list with you in Case d/w team and MD on IDR. Plan explained to patient and all of their concerns were addressed. of an emergency.  A support device such as a splint or brace may be needed. These devices limit movement and protect the joint while it heals. You may be given crutches to use until you can stand on your i      SECONDARY DISCHARGE DIAGNOSES  Diagnosis: Right wrist sprain  Assessment and Plan of Treatment: ice or heat to area  tylenol as neede for pain  DISCHARGE INSTRUCTIONS:  Seek care immediately if:   •You have severe pain or swelling.  •Your injured wrist is red or has red streaks spreading from the injured area.  •You have new trouble moving your hands, fingers, or wrist.  •Your wrist, hand, or fingers feel cold or numb.  •Your fingernails turn blue or gray.  Call your doctor if: Your symptoms get worse.  •Your sprain does not get better within 2 weeks.   •You have questions or concerns about your condition or care.  Medicines: You may need any of the following:   •Acetaminophen decreases pain and fever. It is available without a doctor's order. Ask how much to take and how often to take it. Follow directions. Read the labels of all other medicines you are using to see if they also contain acetaminophen, or ask your doctor or pharmacist. Acetaminophen can cause liver damage if not taken correctly. Do not use more than 4 grams (4,000 milligrams) total of acetaminophen in one day.   •Take your medicine as directed. Contact your healthcare provider if you think your medicine is not helping or if you have side effects. Tell him or her if you are allergic to any medicine. Keep a list of the medicines, vitamins, and herbs you take. Include the amounts, and when and why you take them. Bring the list or the pill bottles to follow-up visits. Carry your medicine list with you in Case d/w team and MD on IDR. Plan explained to patient and all of their concerns were addressed. of an emergency.  Self-care:   •Rest your wrist for at least 48 hours. Avoid activities that cause pain.  •Ice your wrist for 15 to 20 minutes every hour or as directed. Use an ice pack, or put crushed ice in a plastic bag. Cover it with a towel before you put it on your wrist. Ice helps prevent tissue damage and decreases swelling and pain.  •Compress your wrist with an elastic bandage. This will help decrease swelling, support your wrist, and help it heal. Wear your wrist wrap as directed. The elastic bandage should b

## 2021-02-09 NOTE — DISCHARGE NOTE NURSING/CASE MANAGEMENT/SOCIAL WORK - PATIENT PORTAL LINK FT
You can access the FollowMyHealth Patient Portal offered by [facility] by registering at the following website: [email or web address]. By joining [facility]’s FollowMyHealth portal, you will also be able to view your health information using other applications (apps) compatible with our system.

## 2021-02-09 NOTE — PROGRESS NOTE ADULT - SUBJECTIVE AND OBJECTIVE BOX
PCP    Patient is a 65y old  Male who presents with a chief complaint of Contusion L knee , contusion wrists , gait impariment (08 Feb 2021 13:19)      HPI: The patient is a  64 yo male with Hx. of chronic  DVT and PE on Xarelto, HTN, HLD, Hypothyroid, ETOH abuse ( sober since 2016), fell yesterday outside , landed backward and hit head, L knee and L wrist. No LOC, c/o pain in R wrist , L knee, the pain is exacerbated by weight bearing and he can not ambulate.       2/8: seen at bedside, and amb with PT, c/o left knee and right wrist pain  2/9: seen at bedside, without c/o, amb well with PT, going home      ROS: all negative otherwise pertinent  positives documented in HPI    Vital Signs Last 24 Hrs  T(C): 36.6 (02-09-21 @ 09:24), Max: 36.8 (02-09-21 @ 00:10)  T(F): 97.8 (02-09-21 @ 09:24), Max: 98.3 (02-09-21 @ 00:10)  HR: 108 (02-09-21 @ 09:24) (63 - 108)  BP: 131/76 (02-09-21 @ 09:24) (118/50 - 132/49)  BP(mean): --  RR: 18 (02-09-21 @ 09:24) (16 - 18)  SpO2: 98% (02-09-21 @ 09:24) (97% - 99%)    MEDICATIONS  (STANDING):  amLODIPine   Tablet 10 milliGRAM(s) Oral daily  calcium carbonate 1250 mG  + Vitamin D (OsCal 500 + D) 1 Tablet(s) Oral daily  famotidine    Tablet 20 milliGRAM(s) Oral daily  levothyroxine 75 MICROGram(s) Oral daily  pantoprazole    Tablet 40 milliGRAM(s) Oral daily  rivaroxaban 20 milliGRAM(s) Oral with dinner    MEDICATIONS  (PRN):  acetaminophen   Tablet .. 650 milliGRAM(s) Oral every 6 hours PRN Mild Pain (1 - 3)  morphine  - Injectable 4 milliGRAM(s) IV Push every 4 hours PRN Severe Pain (7 - 10)  ondansetron Injectable 4 milliGRAM(s) IV Push every 6 hours PRN Nausea and/or Vomiting        PHYSICAL EXAM:    GENERAL: Comfortable, no acute distress   HEAD:  Normocephalic, atraumatic  EYES: EOMI, PERRLA  HEENT: Moist mucous membranes  NECK: Supple, No JVD  NERVOUS SYSTEM:  Alert & Oriented X3, Motor Strength 5/5 B/L upper and lower extremities  CHEST/LUNG: Clear to auscultation bilaterally  HEART: Regular rate and rhythm  ABDOMEN: Soft, non tender, Nondistended, Bowel sounds present  GENITOURINARY: Voiding, no palpable bladder  EXTREMITIES:   No clubbing, cyanosis, or edema  MUSCULOSKELETAL- + left knee and Right wrist, sprains, no swelling, FROM  SKIN-no rash          radiology tests:      CTH neg,  XRay      XRay wrists neg, X ray Knees neg.     Doppler bilat LE:  Nonocclusive thrombus is seen in the RIGHT popliteal vein and posterior tibial and peroneal trunk. (07 Feb 2021 17:00)            Labs:                          13.3   6.84  )-----------( 181      ( 07 Feb 2021 08:41 )             40.8       02-07    138  |  107  |  11  ----------------------------<  91  4.2   |  24  |  1.02    Ca    9.3      07 Feb 2021 08:41    TPro  7.0  /  Alb  3.4  /  TBili  0.7  /  DBili  x   /  AST  26  /  ALT  30  /  AlkPhos  91  02-07      PT/INR - ( 07 Feb 2021 10:22 )   PT: 18.2 sec;   INR: 1.60 ratio         PTT - ( 07 Feb 2021 10:22 )  PTT:34.8 sec      assessment:  64 yo male with above pmh a/w:      #fall with LT hamstring strain and RT wrist sprain  Ortho eval appreciated  Ice  PT    # DVT  acute vs chronic: not clear on radiology report. No doppler reports in chart review to compare  cont Xarelto  AC consult: appreciated      # hypothyroidism  cont levothyroid    # Doubt UTI, likely asymptomatic bacteriuria   no pyuria  d/c cipro  monitor    # HTN  cont amlodipine    #HLD  not on statin    Dispo  home today. d/w pt, to f/u with his PCP Dr. Hammond at [facility]

## 2021-02-09 NOTE — PROGRESS NOTE ADULT - REASON FOR ADMISSION
Contusion L knee , contusion wrists , gait impairment
Contusion L knee , contusion wrists , gait impairment
Contusion L knee , contusion wrists , gait impariment

## 2021-02-09 NOTE — DISCHARGE NOTE PROVIDER - HOSPITAL COURSE
The patient is a  64 yo male with Hx. of chronic  DVT and PE on Xarelto, HTN, HLD, Hypothyroid, ETOH abuse ( sober since 2016), fell yesterday outside , landed backward and hit head, L knee and L wrist. No LOC, c/o pain in R wrist , L knee, the pain is exacerbated by weight bearing  and was unable to ambulate. Xrays of right wrist, No noted fractures, Left knee Xray neg for fxs,    Ortho consulted, no ortho interventions needed.  seen by PT and is fully ambulatory now.  Pt has a h/o LLE DVT, doppler reported popliteal dvt to posterior tibial with extension to peroneal veins.  Anticoagulation services consulted, DVT noted to be chronic, no changes made, pt to continue on xarelto as previously prescribed.  Pt now ready for discharge home    2/9: for physical exam see today's progress note      < from: Xray Wrist 3 Views, Bilateral (02.07.21 @ 09:19) >      EXAM:  XR FOREARM 2 VIEWS RT                          EXAM:  XR KNEE 3 VIEWS LT                          EXAM:  XR WRIST COMP MIN 3 VIEWS BI                          EXAM:  XR HAND MIN 3 VIEWS RT                            PROCEDURE DATE:  02/07/2021          INTERPRETATION:  Clinical Information: Trauma    Technique: 3 views right hand. 4 views right wrist. 4 views left wrist. 3 views left knee. 2 views right forearm.    Comparison: None    Findings/  Impression: There is no acute fracture or dislocation. The joint spaces are maintained. The soft tissues are unremarkable.    #fall with LT hamstring strain and RT wrist sprain  # DVT  # hypothyroidism  # Doubt UTI, likely asymptomatic bacteriuria  # HTN  #HLD    time for d/c: 46 mins  D/W pt  discharge summary to be faxed to PCP

## 2021-02-09 NOTE — DISCHARGE NOTE PROVIDER - NSDCMRMEDTOKEN_GEN_ALL_CORE_FT
amLODIPine 10 mg oral tablet: 1 tab(s) orally once a day  calcium-vitamin D 500 mg-200 intl units (5 mcg) oral tablet: 1 tab(s) orally once a day  famotidine 20 mg oral tablet, disintegrating:  orally 2 times a day  levothyroxine 75 mcg (0.075 mg) oral tablet: 1 tab(s) orally once a day  omeprazole 20 mg oral delayed release capsule: 1 cap(s) orally once a day  Xarelto 20 mg oral tablet: 1 tab(s) orally once a day (in the evening)

## 2021-02-09 NOTE — PROGRESS NOTE ADULT - SUBJECTIVE AND OBJECTIVE BOX
HPI  HPI:  HPI: The patient is a  66 yo male with Hx. of chronic  DVT and PE on Xarelto, HTN, HLD, Hypothyroid, ETOH abuse ( sober since 2016), fell yesterday outside , landed backward and hit head, L knee and L wrist. No LOC, c/o pain in R wrist , L knee, the pain is exacerbated by weight bearing and he can not ambulate. He received IV Morphine  at 9 am which helped relive the pain for 4-5 hours. Had PT eval in the ER , he could not ambulate and he can not use his R hand because of wrist pain.     Consulted by Dr. Ramirez /Bridget NP for VTE prophylaxis, risk stratification, and anticoagulation management.    PAST MEDICAL & SURGICAL HISTORY:  Other hyperlipidemia    Pulmonary emboli    Acquired hypothyroidism    Hypertension    Alcohol abuse  sober since 4/13/2015    DVT (deep venous thrombosis), right  ankle    S/P cholecystectomy    History of tracheostomy  placed and reversed summer 2015    Feeding by G-tube  placed and reversed summer 2015    S/P hip replacement  b/l    History of gastric surgery  gastric sleeve 1990s    H/O gastric bypass  1990s and 2011    Interval History:  2/8/21: Patient seen at bedside. Denying any pain in RLE at this time. He is aware of chronic clot in right leg. Started with an ankle injury while in service with a + DVT down in vein by ankle in 1990's. At that time he was on coumadin. Once Pradaxa released, was then transitioned and ultimately ended up on Xarelto 20 mg daily. He denies any missed doses. He did see a vascular surgeon about a year ago at [facility] who told him him clot extended to his knee and "Something about my vein above that, I don't remember." After consulting his chart in [facility] I did come across note from Dr. Khan- vascular with doppler report as below citing the chronic right LE DVT and extension of valvular disease and reflux up to femoral vein. He did recommend compression stockings at that time, patient does not wear.   2/9/21: Patient seen at bedside, no change in dispo. Still with knee and wrist pain, but stable to dc to home today. Continuing Xarelto as per usual dose etc as doppler similar to prior one year ago. Chronic DVT RLE.    BMI: 33.9    CrCl: 113.5    IMPROVE VTE Risk Score:4    IMPROVE Bleeding Risk Score:2.5      Falls Risk:   High (  )  Mod ( x )  Low (  )      FAMILY HISTORY:  No pertinent family history in first degree relatives      Denies any personal or familial history of clotting or bleeding disorders.    Allergies    penicillin (Fever)  penicillins (Unknown)    Intolerances        REVIEW OF SYSTEMS    (  )Fever	        (  )Constipation	(  )SOB				  (  )Headache   (  )Dysuria  (  )Chills	        (  )Melena	        (  )Dyspnea on exertion (  )Dizziness    (  )Polyuria  (  )Nausea      (  )Hematochezia	(  )Cough                          (  )Syncope      (  )Hematuria  (  )Vomiting   (  )Chest Pain	        (  )Wheezing			  (  )Weakness  (  )Diarrhea    (  )Palpitations	(  )Anorexia			  (  )Myalgia       (   ) Arthralgia    Pertinent positives in HPI and daily subjective. All other systems negative.    Vital Signs Last 24 Hrs  T(C): 36.6 (02-09-21 @ 09:24), Max: 36.8 (02-09-21 @ 00:10)  T(F): 97.8 (02-09-21 @ 09:24), Max: 98.3 (02-09-21 @ 00:10)  HR: 108 (02-09-21 @ 09:24) (63 - 108)  BP: 131/76 (02-09-21 @ 09:24) (118/50 - 138/62)  BP(mean): --  RR: 18 (02-09-21 @ 09:24) (16 - 18)  SpO2: 98% (02-09-21 @ 09:24) (97% - 99%)      2/7/2021:  IMPRESSION:  Nonocclusive thrombus is seen in the RIGHT popliteal vein and posterior tibial and peroneal trunk.    2/3/2020  Procedure  RLE venous duplex shows chronic DVT with recanalization noted in the popliteal vein. Reflux noted in the femoral vein to popliteal vein. There is also GSV reflux as well as VV noted in the thigh and calf.     IMPRESSION: Head CT  Mild chronic microvascular changes without evidence of an acute transcortical infarction or hemorrhage.    PHYSICAL EXAM:    Constitutional: Appears Well    Neurological: A& O x 3    Skin: Warm    Respiratory and Thorax: normal effort; Breath sounds: normal; No rales/wheezing/rhonchi  	  Cardiovascular: S1, S2, regular, NMBR	    Gastrointestinal: BS + x 4Q, nontender	    Musculoskeletal:   General Right:   no muscle/joint tenderness,   normal tone, no joint swelling,   ROM: full	    General Left:   no muscle/joint tenderness,   normal tone, no joint swelling,   ROM: full    Lower extrems:   Right: no calf tenderness              negative madhavi's sign               + pedal pulses    Left:   no calf tenderness              negative madhavi's sign               + pedal pulses                PT/INR - ( 07 Feb 2021 10:22 )   PT: 18.2 sec;   INR: 1.60 ratio         PTT - ( 07 Feb 2021 10:22 )  PTT:34.8 sec                        13.3   6.84  )-----------( 181      ( 07 Feb 2021 08:41 )             40.8       02-07    138  |  107  |  11  ----------------------------<  91  4.2   |  24  |  1.02    Ca    9.3      07 Feb 2021 08:41    TPro  7.0  /  Alb  3.4  /  TBili  0.7  /  DBili  x   /  AST  26  /  ALT  30  /  AlkPhos  91  02-07      PT/INR - ( 07 Feb 2021 10:22 )   PT: 18.2 sec;   INR: 1.60 ratio         PTT - ( 07 Feb 2021 10:22 )  PTT:34.8 sec				    MEDICATIONS  (STANDING):  amLODIPine   Tablet 10 milliGRAM(s) Oral daily  calcium carbonate 1250 mG  + Vitamin D (OsCal 500 + D) 1 Tablet(s) Oral daily  ciprofloxacin     Tablet 500 milliGRAM(s) Oral every 12 hours  famotidine    Tablet 20 milliGRAM(s) Oral daily  influenza   Vaccine 0.5 milliLiter(s) IntraMuscular once  levothyroxine 75 MICROGram(s) Oral daily  pantoprazole    Tablet 40 milliGRAM(s) Oral daily  rivaroxaban 20 milliGRAM(s) Oral with dinner      **Current DVT Prophylaxis:    LMWH                   (  )  Heparin SQ           (  )  Coumadin             (  )  Xarelto                  (x  )  Eliquis                   (  )  Venodynes           (  )  Ambulation          ( x )  UFH                       (  )  ECASA                   (  )  Contraindicated  (  )

## 2021-02-09 NOTE — PROGRESS NOTE ADULT - ASSESSMENT
This is a 65 year old male present now s/p fall not sustaining any serious acute injury with history of chronic RLE DVT on Xarelto. Positive head strike with negative CT head. Doppler does show + RLE DVT, however does not indicate chronicity. Unable to reach Radiology at this time but will continue to attempt to determine chronicity. I have further researched and found doppler report as well as vascular consult from one year ago, Both seem to correlate with the history obtained from patient, so I am inclined to think that this DVT is, in fact, chronic. I would recommend Xarelto 20 mg PO daily as per usual, with meal.    Plan:  ::Continue Xarelto 20mg PO daily  ::Daily CBC/BMP  ::Enc ambulation  ::Recommend compression stocking upon discharge    Will sign off, reconsult if necessary.  Dispo: Home

## 2021-02-16 DIAGNOSIS — I82.551 CHRONIC EMBOLISM AND THROMBOSIS OF RIGHT PERONEAL VEIN: ICD-10-CM

## 2021-02-16 DIAGNOSIS — Z98.84 BARIATRIC SURGERY STATUS: ICD-10-CM

## 2021-02-16 DIAGNOSIS — E03.8 OTHER SPECIFIED HYPOTHYROIDISM: ICD-10-CM

## 2021-02-16 DIAGNOSIS — Z79.01 LONG TERM (CURRENT) USE OF ANTICOAGULANTS: ICD-10-CM

## 2021-02-16 DIAGNOSIS — S60.212A CONTUSION OF LEFT WRIST, INITIAL ENCOUNTER: ICD-10-CM

## 2021-02-16 DIAGNOSIS — I45.10 UNSPECIFIED RIGHT BUNDLE-BRANCH BLOCK: ICD-10-CM

## 2021-02-16 DIAGNOSIS — Z96.643 PRESENCE OF ARTIFICIAL HIP JOINT, BILATERAL: ICD-10-CM

## 2021-02-16 DIAGNOSIS — I82.531 CHRONIC EMBOLISM AND THROMBOSIS OF RIGHT POPLITEAL VEIN: ICD-10-CM

## 2021-02-16 DIAGNOSIS — Z90.49 ACQUIRED ABSENCE OF OTHER SPECIFIED PARTS OF DIGESTIVE TRACT: ICD-10-CM

## 2021-02-16 DIAGNOSIS — Z86.711 PERSONAL HISTORY OF PULMONARY EMBOLISM: ICD-10-CM

## 2021-02-16 DIAGNOSIS — S63.501A UNSPECIFIED SPRAIN OF RIGHT WRIST, INITIAL ENCOUNTER: ICD-10-CM

## 2021-02-16 DIAGNOSIS — Z79.899 OTHER LONG TERM (CURRENT) DRUG THERAPY: ICD-10-CM
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
## 2021-02-16 DIAGNOSIS — S80.02XA CONTUSION OF LEFT KNEE, INITIAL ENCOUNTER: ICD-10-CM

## 2021-02-16 DIAGNOSIS — S60.211A CONTUSION OF RIGHT WRIST, INITIAL ENCOUNTER: ICD-10-CM

## 2021-02-16 DIAGNOSIS — M77.8 OTHER ENTHESOPATHIES, NOT ELSEWHERE CLASSIFIED: ICD-10-CM

## 2021-02-16 DIAGNOSIS — R26.89 OTHER ABNORMALITIES OF GAIT AND MOBILITY: ICD-10-CM

## 2021-02-16 DIAGNOSIS — Z88.0 ALLERGY STATUS TO PENICILLIN: ICD-10-CM

## 2021-02-16 DIAGNOSIS — I82.541 CHRONIC EMBOLISM AND THROMBOSIS OF RIGHT TIBIAL VEIN: ICD-10-CM

## 2021-02-16 DIAGNOSIS — E78.5 HYPERLIPIDEMIA, UNSPECIFIED: ICD-10-CM

## 2021-02-16 DIAGNOSIS — I10 ESSENTIAL (PRIMARY) HYPERTENSION: ICD-10-CM
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
## 2021-02-16 DIAGNOSIS — S63.591A OTHER SPECIFIED SPRAIN OF RIGHT WRIST, INITIAL ENCOUNTER: ICD-10-CM

## 2021-02-16 DIAGNOSIS — S76.812A STRAIN OF OTHER SPECIFIED MUSCLES, FASCIA AND TENDONS AT THIGH LEVEL, LEFT THIGH, INITIAL ENCOUNTER: ICD-10-CM

## 2022-09-23 NOTE — PATIENT PROFILE ADULT. - PAIN DESCRIPTION (FREQUENCY/QUALITY), PROFILE
Quality 110: Preventive Care And Screening: Influenza Immunization: Influenza Immunization Administered during Influenza season Quality 226: Preventive Care And Screening: Tobacco Use: Screening And Cessation Intervention: Patient screened for tobacco use, is a smoker AND did not received Cessation Counseling for Medical Reasons within the Previous 12 Months Quality 111:Pneumonia Vaccination Status For Older Adults: Pneumococcal vaccine (PPSV23) administered on or after patient’s 60th birthday and before the end of the measurement period Detail Level: Detailed Quality 431: Preventive Care And Screening: Unhealthy Alcohol Use - Screening: Patient identified as an unhealthy alcohol user when screened for unhealthy alcohol use using a systematic screening method and received brief counseling tingling/cramping

## 2023-01-17 NOTE — H&P ADULT - PROBLEM SELECTOR PROBLEM 6
23 @ 12:26 :  [id number] ; Caleb : Friend:  son left for vacation     Patient is a 89y old  Male who presents with a chief complaint of SOB x1 day (2023 03:16)      HPI:  Patient provides limited history , family and caretaker unavailable at time of interview and unable to be reached by phone.  Patient is a 89 year old male w/pmh dementia , HTN , advanced lung cancer , presents for increased shortness of breath and work of breathing. Per ED provider , patient with intermittent cough ,  labored breathing including retractions on presentation , improved with bipap.    (2023 03:16)   he is on 50% high flow: :  he is alert and awake: called home:  the lady jessica speak english:   [phone]   per caleb  : who is staying home now as son i s on vacation:  he has lung cancer and coughed up blood  and hence she called the ER: : per her he coughed up blood : jessica quantitate: :  she also had limited history   " he also is hallucinating for last three days   ?FOLLOWING PRESENT  [x] Hx of PE/DVT, [ xx] Hx COPD, [ x] Hx of Asthma, [y ] Hx of Hospitalization, [ x]  Hx of BiPAP/CPAP use, [x ] Hx of NADEEM    Allergies    No Known Allergies    Intolerances        PAST MEDICAL & SURGICAL HISTORY:  HTN (hypertension)      HLD (hyperlipidemia)      BPH (benign prostatic hypertrophy)      S/P cataract surgery      S/P hernia repair  bilaterally      S/P prostatectomy          FAMILY HISTORY:      Social History: [ ex smoker ] TOBACCO                  [x ] ETOH                                 [ x ] IVDA/DRUGS    REVIEW OF SYSTEMS    poor historian:     General:	x    Skin/Breast:x  	  Ophthalmologic:x  	  ENMT:	x    Respiratory and Thorax:  sob,  mild bleeding from mouth   	  Cardiovascular:	x    Gastrointestinal:	x    Genitourinary:	x    Musculoskeletal:	x    Neurological:	x    Psychiatric:	x    Hematology/Lymphatics:	x    Endocrine:	x    Allergic/Immunologic:	x    MEDICATIONS  (STANDING):  enoxaparin Injectable 40 milliGRAM(s) SubCutaneous every 24 hours  piperacillin/tazobactam IVPB.. 3.375 Gram(s) IV Intermittent every 8 hours    MEDICATIONS  (PRN):  acetaminophen     Tablet .. 650 milliGRAM(s) Oral every 6 hours PRN Temp greater or equal to 38C (100.4F), Mild Pain (1 - 3)  melatonin 3 milliGRAM(s) Oral at bedtime PRN Insomnia  ondansetron Injectable 4 milliGRAM(s) IV Push every 8 hours PRN Nausea and/or Vomiting       Vital Signs Last 24 Hrs  T(C): 36.2 (2023 06:15), Max: 37.7 (2023 01:08)  T(F): 97.2 (2023 06:15), Max: 99.8 (2023 01:08)  HR: 83 (2023 08:34) (79 - 122)  BP: 155/79 (2023 06:15) (86/64 - 158/70)  BP(mean): 98 (2023 06:15) (68 - 98)  RR: 20 (2023 08:34) (16 - 24)  SpO2: 100% (2023 08:34) (95% - 100%)    Parameters below as of 2023 08:34  Patient On (Oxygen Delivery Method): nasal cannula, high flow  O2 Flow (L/min): 50  O2 Concentration (%): 70Orthostatic VS          I&O's Summary      Physical Exam:   GENERAL: NAD, well-groomed, well-developed  HEENT: CORKY/   Atraumatic, Normocephalic  ENMT: No tonsillar erythema, exudates, or enlargement; Moist mucous membranes, Good dentition, No lesions  NECK: Supple, No JVD, Normal thyroid  CHEST/LUNG: poor air entry bilaterally: -no wheezing  CVS: Regular rate and rhythm; No murmurs, rubs, or gallops  GI: : Soft, Nontender, Nondistended; Bowel sounds present  NERVOUS SYSTEM:  Alert & aw  EXTREMITIES: -edema  LYMPH: No lymphadenopathy noted  SKIN: No rashes or lesions  ENDOCRINOLOGY: No Thyromegaly  PSYCH: dementia    Labs:  0.8<60<4>>27<<7.285>>0.8<<3><<4><<5<<279>>SARS-CoV-2: NotDetec (2023 01:16)                              8.2    7.47  )-----------( 247      ( 2023 05:13 )             29.1                         8.7    7.37  )-----------( 279      ( 2023 01:16 )             30.7         145  |  109<H>  |  38<H>  ----------------------------<  125<H>  5.1   |  24  |  1.59<H>      143  |  107  |  38<H>  ----------------------------<  130<H>  5.4<H>   |  24  |  1.63<H>    Ca    9.1      2023 05:13  Ca    9.9      2023 01:16    TPro  6.1  /  Alb  2.9<L>  /  TBili  0.2  /  DBili  x   /  AST  9<L>  /  ALT  8<L>  /  AlkPhos  48    TPro  6.6  /  Alb  3.3  /  TBili  0.2  /  DBili  x   /  AST  9<L>  /  ALT  9<L>  /  AlkPhos  54      CAPILLARY BLOOD GLUCOSE        LIVER FUNCTIONS - ( 2023 05:13 )  Alb: 2.9 g/dL / Pro: 6.1 g/dL / ALK PHOS: 48 U/L / ALT: 8 U/L / AST: 9 U/L / GGT: x           PT/INR - ( 2023 01:16 )   PT: 30.2 sec;   INR: 2.60 ratio         PTT - ( 2023 01:16 )  PTT:36.5 sec  Urinalysis Basic - ( 2023 04:39 )    Color: Dark Yellow / Appearance: Slightly Turbid / S.025 / pH: x  Gluc: x / Ketone: Negative  / Bili: Small / Urobili: 3 mg/dL   Blood: x / Protein: 100 mg/dl / Nitrite: Negative   Leuk Esterase: Negative / RBC: 8 /hpf / WBC 6 /HPF   Sq Epi: x / Non Sq Epi: 6 /hpf / Bacteria: Negative      D DImer  Procalcitonin, Serum: 0.14 ng/mL ( @ 01:16)      Studies  Chest X-RAY  CT SCAN Chest   CT Abdomen  Venous Dopplers: LE:   Others        rad< from: CT Chest No Cont (23 @ 02:59) >  1.2 x 1.0 cm. Enlarged subcarinal lymph node measuring 1.7 x 1.4 cm.  VESSELS: Calcified plaque in the aortic arch and coronary arteries.  HEART: Heart size is enlarged. No pericardial effusion.  CHEST WALL AND LOWER NECK: Mild bilateral gynecomastia.  VISUALIZED UPPER ABDOMEN: Left renal cyst. Nonspecific left perinephric   fat stranding.  BONES: Degenerative changes of the spine. Old posterior left 10th and   11th rib fractures.    IMPRESSION:    1. Right middle lobe airspace consolidation likely infectious in   etiology; however, an underlying mass with associated post obstructive   pneumonia cannot be excluded.  2. Left upper lobe pulmonary nodules measuring up to 6 mm.    --- End of Report ---           ANGELICA AHMADI MD; Resident Radiologist  This document has been electronically signed.  MISA PAREDES MD; Attending Radiologist  This document has been electronically signed. 2023  5:13AM    < end of copied text >        < from: Transthoracic Echocardiogram w/Doppler (30.12 @ 14:58) >  PWT:    1.3   0.6 - 1.1 cm  LVIDd:  5.4    3.0 - 5.6 cm  LVIDs:         1.8 - 4.0 cm  Derived variables:  LVMI: 136 g/m2  RWT: 0.48  Ejection Fraction: 60 %  ------------------------------------------------------------------------  Observations:  Mitral Valve: Normal mitral valve. Minimal mitral  regurgitation.  Aortic Valve/Aorta: Calcified trileaflet aortic valve with  normal opening. Mild aortic regurgitation.  Normal aortic root.  Left Atrium: Mildly dilated left atrium.  LA volume index =  29 cc/m2.  Left Ventricle: Normal left ventricular systolic function  with paradoxical septasl motion consistent with conduction  defect.  Moderate concentric left ventricular hypertrophy.  Mild diastolic dysfunction (Stage I).  Right Heart: Normal right atrium. Normal right ventricular  size and function. Normal tricuspid valve. Minimal  tricuspid regurgitation. Pulmonic valve not well  visualized. No pulmonic regurgitation.  Pericardium/Pleura: Normal pericardium with no pericardial  effusion.  Hemodynamic:Estimated right atrial pressure is 8 mm Hg.  Estimated right ventricular systolic pressure equals 27 mm  Hg, assuming right atrial pressure equals 8 mm Hg,  consistent with normal pulmonary pressures.  ------------------------------------------------------------------------  Conclusions:  Technically difficult study due to poor echo windows and  suboptimal endocardial definition.  1. Calcified trileaflet aortic valve with normal opening.  Mild aortic regurgitation.  2. Mildly dilated left atrium. LA volume index = 29 cc/m2.  3. Moderate concentric left ventricular hypertrophy.  4. Normal left ventricular systolic function with  paradoxical septasl motion consistent with conduction  defect.  5. Mild diastolic dysfunction (Stage I).  6. Normalright ventricular size and function.  *** No previous Echo exam.  ------------------------------------------------------------------------  Confirmed on  2012 - 16:07:45 by Vamshi Hdez M.D.  ------------------------------------------------------------------------    < end of copied text >       Hyponatremia
